# Patient Record
Sex: MALE | Race: ASIAN | NOT HISPANIC OR LATINO | Employment: FULL TIME
[De-identification: names, ages, dates, MRNs, and addresses within clinical notes are randomized per-mention and may not be internally consistent; named-entity substitution may affect disease eponyms.]

---

## 2017-07-25 ENCOUNTER — RECORDS - HEALTHEAST (OUTPATIENT)
Dept: ADMINISTRATIVE | Facility: OTHER | Age: 22
End: 2017-07-25

## 2018-11-08 ENCOUNTER — RECORDS - HEALTHEAST (OUTPATIENT)
Dept: ADMINISTRATIVE | Facility: OTHER | Age: 23
End: 2018-11-08

## 2019-10-07 ENCOUNTER — OFFICE VISIT - HEALTHEAST (OUTPATIENT)
Dept: FAMILY MEDICINE | Facility: CLINIC | Age: 24
End: 2019-10-07

## 2019-10-07 ENCOUNTER — COMMUNICATION - HEALTHEAST (OUTPATIENT)
Dept: TELEHEALTH | Facility: CLINIC | Age: 24
End: 2019-10-07

## 2019-10-07 DIAGNOSIS — D75.A G6PD DEFICIENCY: ICD-10-CM

## 2019-10-07 DIAGNOSIS — L30.9 DERMATITIS: ICD-10-CM

## 2019-10-07 DIAGNOSIS — K92.1 BLOOD IN STOOL: ICD-10-CM

## 2019-10-07 DIAGNOSIS — Z00.00 WELL ADULT EXAM: ICD-10-CM

## 2019-10-07 LAB
ERYTHROCYTE [DISTWIDTH] IN BLOOD BY AUTOMATED COUNT: 10.1 % (ref 11–14.5)
FERRITIN SERPL-MCNC: 100 NG/ML (ref 27–300)
HCT VFR BLD AUTO: 45.3 % (ref 40–54)
HGB BLD-MCNC: 15.2 G/DL (ref 14–18)
HIV 1+2 AB+HIV1 P24 AG SERPL QL IA: NEGATIVE
MCH RBC QN AUTO: 29.7 PG (ref 27–34)
MCHC RBC AUTO-ENTMCNC: 33.5 G/DL (ref 32–36)
MCV RBC AUTO: 89 FL (ref 80–100)
PLATELET # BLD AUTO: 337 THOU/UL (ref 140–440)
PMV BLD AUTO: 9.1 FL (ref 7–10)
RBC # BLD AUTO: 5.1 MILL/UL (ref 4.4–6.2)
WBC: 7.8 THOU/UL (ref 4–11)

## 2019-10-07 RX ORDER — HYDROCORTISONE 2.5 %
CREAM (GRAM) TOPICAL
Qty: 30 G | Refills: 0 | Status: SHIPPED | OUTPATIENT
Start: 2019-10-07 | End: 2021-07-21

## 2019-10-07 ASSESSMENT — MIFFLIN-ST. JEOR: SCORE: 1800.25

## 2020-09-12 ENCOUNTER — RECORDS - HEALTHEAST (OUTPATIENT)
Dept: LAB | Facility: CLINIC | Age: 25
End: 2020-09-12

## 2020-09-12 LAB — SICKLE CELL PREP: NEGATIVE

## 2020-09-13 LAB — GLUCOSE-6-PHOSPHATE DEHYDROGENASE QUAL - HISTORICAL: ABNORMAL

## 2020-11-07 ENCOUNTER — RECORDS - HEALTHEAST (OUTPATIENT)
Dept: LAB | Facility: CLINIC | Age: 25
End: 2020-11-07

## 2020-11-08 LAB — GLUCOSE-6-PHOSPHATE DEHYDROGENASE QUAL - HISTORICAL: ABNORMAL

## 2020-11-11 ENCOUNTER — OFFICE VISIT - HEALTHEAST (OUTPATIENT)
Dept: FAMILY MEDICINE | Facility: CLINIC | Age: 25
End: 2020-11-11

## 2020-11-11 ENCOUNTER — RECORDS - HEALTHEAST (OUTPATIENT)
Dept: GENERAL RADIOLOGY | Facility: CLINIC | Age: 25
End: 2020-11-11

## 2020-11-11 DIAGNOSIS — R07.89 OTHER CHEST PAIN: ICD-10-CM

## 2020-11-11 DIAGNOSIS — Z84.89 FAMILY HISTORY OF SUDDEN DEATH IN FATHER: ICD-10-CM

## 2020-11-11 DIAGNOSIS — Z22.7 LATENT TUBERCULOSIS: ICD-10-CM

## 2020-11-11 DIAGNOSIS — R07.89 ATYPICAL CHEST PAIN: ICD-10-CM

## 2020-11-11 DIAGNOSIS — Z22.7 LTBI (LATENT TUBERCULOSIS INFECTION): ICD-10-CM

## 2020-11-11 DIAGNOSIS — Z00.00 HEALTHCARE MAINTENANCE: ICD-10-CM

## 2020-11-11 LAB
ANION GAP SERPL CALCULATED.3IONS-SCNC: 5 MMOL/L (ref 5–18)
ATRIAL RATE - MUSE: 62 BPM
BUN SERPL-MCNC: 13 MG/DL (ref 8–22)
CALCIUM SERPL-MCNC: 9.7 MG/DL (ref 8.5–10.5)
CHLORIDE BLD-SCNC: 107 MMOL/L (ref 98–107)
CHOLEST SERPL-MCNC: 197 MG/DL
CO2 SERPL-SCNC: 27 MMOL/L (ref 22–31)
CREAT SERPL-MCNC: 1.41 MG/DL (ref 0.7–1.3)
DIASTOLIC BLOOD PRESSURE - MUSE: NORMAL
ERYTHROCYTE [DISTWIDTH] IN BLOOD BY AUTOMATED COUNT: 9.7 % (ref 11–14.5)
ERYTHROCYTE [SEDIMENTATION RATE] IN BLOOD BY WESTERGREN METHOD: 1 MM/HR (ref 0–15)
FASTING STATUS PATIENT QL REPORTED: NORMAL
GFR SERPL CREATININE-BSD FRML MDRD: >60 ML/MIN/1.73M2
GLUCOSE BLD-MCNC: 85 MG/DL (ref 70–125)
HCT VFR BLD AUTO: 44.7 % (ref 40–54)
HDLC SERPL-MCNC: 43 MG/DL
HGB BLD-MCNC: 14.6 G/DL (ref 14–18)
INTERPRETATION ECG - MUSE: NORMAL
MCH RBC QN AUTO: 29.9 PG (ref 27–34)
MCHC RBC AUTO-ENTMCNC: 32.6 G/DL (ref 32–36)
MCV RBC AUTO: 92 FL (ref 80–100)
P AXIS - MUSE: 43 DEGREES
PLATELET # BLD AUTO: 335 THOU/UL (ref 140–440)
PMV BLD AUTO: 9 FL (ref 7–10)
POTASSIUM BLD-SCNC: 4.4 MMOL/L (ref 3.5–5)
PR INTERVAL - MUSE: 152 MS
QRS DURATION - MUSE: 88 MS
QT - MUSE: 358 MS
QTC - MUSE: 363 MS
R AXIS - MUSE: 68 DEGREES
RBC # BLD AUTO: 4.88 MILL/UL (ref 4.4–6.2)
SODIUM SERPL-SCNC: 139 MMOL/L (ref 136–145)
SYSTOLIC BLOOD PRESSURE - MUSE: NORMAL
T AXIS - MUSE: 8 DEGREES
VENTRICULAR RATE- MUSE: 62 BPM
WBC: 8.6 THOU/UL (ref 4–11)

## 2020-11-11 ASSESSMENT — MIFFLIN-ST. JEOR: SCORE: 1801.98

## 2020-11-11 NOTE — ASSESSMENT & PLAN NOTE
Patient has had a flu shot, up-to-date vaccines, HIV testing done,   Report provided to transport service.  Report called to [facility] and given to Christina ZHENG.  Pt transported with stable vitals.

## 2020-11-13 ENCOUNTER — COMMUNICATION - HEALTHEAST (OUTPATIENT)
Dept: SCHEDULING | Facility: CLINIC | Age: 25
End: 2020-11-13

## 2020-11-24 ENCOUNTER — OFFICE VISIT - HEALTHEAST (OUTPATIENT)
Dept: FAMILY MEDICINE | Facility: CLINIC | Age: 25
End: 2020-11-24

## 2020-11-24 DIAGNOSIS — F32.1 MODERATE MAJOR DEPRESSION (H): ICD-10-CM

## 2020-11-24 RX ORDER — VENLAFAXINE HYDROCHLORIDE 75 MG/1
75 CAPSULE, EXTENDED RELEASE ORAL DAILY
Qty: 60 CAPSULE | Refills: 0 | Status: SHIPPED | OUTPATIENT
Start: 2020-11-24 | End: 2021-07-21

## 2020-11-24 ASSESSMENT — PATIENT HEALTH QUESTIONNAIRE - PHQ9: SUM OF ALL RESPONSES TO PHQ QUESTIONS 1-9: 16

## 2020-12-09 ENCOUNTER — OFFICE VISIT - HEALTHEAST (OUTPATIENT)
Dept: FAMILY MEDICINE | Facility: CLINIC | Age: 25
End: 2020-12-09

## 2020-12-09 DIAGNOSIS — F32.1 MODERATE MAJOR DEPRESSION (H): ICD-10-CM

## 2020-12-09 ASSESSMENT — PATIENT HEALTH QUESTIONNAIRE - PHQ9: SUM OF ALL RESPONSES TO PHQ QUESTIONS 1-9: 8

## 2021-01-04 ENCOUNTER — OFFICE VISIT - HEALTHEAST (OUTPATIENT)
Dept: BEHAVIORAL HEALTH | Facility: CLINIC | Age: 26
End: 2021-01-04

## 2021-01-04 ENCOUNTER — AMBULATORY - HEALTHEAST (OUTPATIENT)
Dept: BEHAVIORAL HEALTH | Facility: CLINIC | Age: 26
End: 2021-01-04

## 2021-01-04 ENCOUNTER — COMMUNICATION - HEALTHEAST (OUTPATIENT)
Dept: ADMINISTRATIVE | Facility: CLINIC | Age: 26
End: 2021-01-04

## 2021-01-04 DIAGNOSIS — F43.23 ADJUSTMENT DISORDER WITH MIXED ANXIETY AND DEPRESSED MOOD: ICD-10-CM

## 2021-01-04 DIAGNOSIS — K64.9 HEMORRHOIDS, UNSPECIFIED HEMORRHOID TYPE: ICD-10-CM

## 2021-01-04 ASSESSMENT — PATIENT HEALTH QUESTIONNAIRE - PHQ9: SUM OF ALL RESPONSES TO PHQ QUESTIONS 1-9: 8

## 2021-01-04 ASSESSMENT — ANXIETY QUESTIONNAIRES
1. FEELING NERVOUS, ANXIOUS, OR ON EDGE: MORE THAN HALF THE DAYS
7. FEELING AFRAID AS IF SOMETHING AWFUL MIGHT HAPPEN: SEVERAL DAYS
IF YOU CHECKED OFF ANY PROBLEMS ON THIS QUESTIONNAIRE, HOW DIFFICULT HAVE THESE PROBLEMS MADE IT FOR YOU TO DO YOUR WORK, TAKE CARE OF THINGS AT HOME, OR GET ALONG WITH OTHER PEOPLE: SOMEWHAT DIFFICULT
5. BEING SO RESTLESS THAT IT IS HARD TO SIT STILL: SEVERAL DAYS
2. NOT BEING ABLE TO STOP OR CONTROL WORRYING: SEVERAL DAYS
GAD7 TOTAL SCORE: 7
6. BECOMING EASILY ANNOYED OR IRRITABLE: SEVERAL DAYS
3. WORRYING TOO MUCH ABOUT DIFFERENT THINGS: SEVERAL DAYS
4. TROUBLE RELAXING: NOT AT ALL

## 2021-01-19 ENCOUNTER — AMBULATORY - HEALTHEAST (OUTPATIENT)
Dept: BEHAVIORAL HEALTH | Facility: CLINIC | Age: 26
End: 2021-01-19

## 2021-01-21 ENCOUNTER — COMMUNICATION - HEALTHEAST (OUTPATIENT)
Dept: FAMILY MEDICINE | Facility: CLINIC | Age: 26
End: 2021-01-21

## 2021-01-22 ENCOUNTER — AMBULATORY - HEALTHEAST (OUTPATIENT)
Dept: FAMILY MEDICINE | Facility: CLINIC | Age: 26
End: 2021-01-22

## 2021-01-22 DIAGNOSIS — Z20.822 EXPOSURE TO COVID-19 VIRUS: ICD-10-CM

## 2021-01-27 ENCOUNTER — RECORDS - HEALTHEAST (OUTPATIENT)
Dept: ADMINISTRATIVE | Facility: OTHER | Age: 26
End: 2021-01-27

## 2021-05-27 ASSESSMENT — PATIENT HEALTH QUESTIONNAIRE - PHQ9
SUM OF ALL RESPONSES TO PHQ QUESTIONS 1-9: 16
SUM OF ALL RESPONSES TO PHQ QUESTIONS 1-9: 8
SUM OF ALL RESPONSES TO PHQ QUESTIONS 1-9: 8

## 2021-05-28 ASSESSMENT — ANXIETY QUESTIONNAIRES: GAD7 TOTAL SCORE: 7

## 2021-06-02 NOTE — PROGRESS NOTES
Adult Physical:    CC:  Establish care    HPI:  Some blood in stool while in service.  Said it was checked.  Doesn't know what the cause was ? Hemorrhoids.    Patient Active Problem List   Diagnosis     G6PD deficiency     Past Medical History:  History reviewed. No pertinent past medical history.    Past Surgical History:  History reviewed. No pertinent surgical history.  (wisdom teeth out)    Medicines:  none    Allergies:  No Known Allergies    Family History:  No family history on file.    Social History:  Social History     Socioeconomic History     Marital status: Single     Spouse name: Not on file     Number of children: Not on file     Years of education: Not on file     Highest education level: Not on file   Occupational History     Not on file   Social Needs     Financial resource strain: Not on file     Food insecurity:     Worry: Not on file     Inability: Not on file     Transportation needs:     Medical: Not on file     Non-medical: Not on file   Tobacco Use     Smoking status: Former Smoker     Packs/day: 0.00     Smokeless tobacco: Never Used     Tobacco comment: quit 2018   Substance and Sexual Activity     Alcohol use: Not on file     Drug use: Not on file     Sexual activity: Not on file   Lifestyle     Physical activity:     Days per week: Not on file     Minutes per session: Not on file     Stress: Not on file   Relationships     Social connections:     Talks on phone: Not on file     Gets together: Not on file     Attends Hoahaoism service: Not on file     Active member of club or organization: Not on file     Attends meetings of clubs or organizations: Not on file     Relationship status: Not on file     Intimate partner violence:     Fear of current or ex partner: Not on file     Emotionally abused: Not on file     Physically abused: Not on file     Forced sexual activity: Not on file   Other Topics Concern     Not on file   Social History Narrative     Not on file     Review of Systems:  10  "point review.  unremarkable    Physical Exam:  /68 (Patient Site: Right Arm, Patient Position: Sitting, Cuff Size: Adult Regular)   Pulse 80   Temp 98.5  F (36.9  C) (Oral)   Ht 5' 7.5\" (1.715 m)   Wt 186 lb (84.4 kg)   BMI 28.70 kg/m    Gen:   Alert, not distressed  Head:   Normocephalic, without obvious abnormality, atraumatic  Eyes:   PERRL, conjunctiva/corneas clear, EOM's intact  Ears:   Normal tympanic membranes and external ear canals  Nose:    Mucosa normal, no drainage or sinus tenderness  Throat:    No erythema or exudates  Neck:    No adenopathy no nodules in thyroid, normal ROM  Lungs:    Clear to auscultation bilaterally, respirations unlabored  Chest wall:  No tenderness or deformity  Heart:     Regular, normal S1 and S2, no murmur, gallop or rub  Abdomen:  Soft, non-tender, no masses, no organomegaly  Back:    Symmetric, no curvature, ROM normal, no CVA tenderness  Extremities: Extremities normal, atraumatic, no cyanosis or edema  Skin:   Skin color, texture, turgor normal, no rashes or lesions  Lymph nodes: Cervical, and supraclavicular, nodes normal  Neurologic: CNII-XII intact.   DTRs normal and symmetric.  Symmetric strength and sensation.    Immunizations Due:  Flu    Tobacco Cessation:   n/a    Cancer Screening:  None due    Weight management:   The patient was counseled regarding nutrition and physical activity    Health Care Directives:  Full code.    Assessment and Plan:  1. Well adult exam  - HIV Antigen/Antibody Screening [place]  - HM2(CBC w/o Differential)  - Influenza, Seasonal Quad, PF =/> 6months    2. Dermatitis  - hydrocortisone 2.5 % cream; Apply thin layer to affected area two times per day.  Dispense: 30 g; Refill: 0    3. Blood in stool  - Ambulatory referral to Gastroenterology    4. G6PD deficiency  - Ferritin   "

## 2021-06-03 VITALS
DIASTOLIC BLOOD PRESSURE: 68 MMHG | WEIGHT: 186 LBS | SYSTOLIC BLOOD PRESSURE: 102 MMHG | HEIGHT: 68 IN | HEART RATE: 80 BPM | BODY MASS INDEX: 28.19 KG/M2 | TEMPERATURE: 98.5 F

## 2021-06-04 VITALS
BODY MASS INDEX: 29.35 KG/M2 | HEART RATE: 69 BPM | WEIGHT: 187 LBS | DIASTOLIC BLOOD PRESSURE: 78 MMHG | SYSTOLIC BLOOD PRESSURE: 116 MMHG | HEIGHT: 67 IN | RESPIRATION RATE: 17 BRPM | TEMPERATURE: 69 F

## 2021-06-08 ENCOUNTER — OFFICE VISIT - HEALTHEAST (OUTPATIENT)
Dept: FAMILY MEDICINE | Facility: CLINIC | Age: 26
End: 2021-06-08

## 2021-06-08 DIAGNOSIS — Z22.7 LTBI (LATENT TUBERCULOSIS INFECTION): ICD-10-CM

## 2021-06-13 NOTE — PROGRESS NOTES
"Love Ricks is a 25 y.o. male who is being evaluated via a billable telephone visit.      The patient has been notified of following:     \"This telephone visit will be conducted via a call between you and your physician/provider. We have found that certain health care needs can be provided without the need for a physical exam.  This service lets us provide the care you need with a short phone conversation.  If a prescription is necessary we can send it directly to your pharmacy.  If lab work is needed we can place an order for that and you can then stop by our lab to have the test done at a later time.    Telephone visits are billed at different rates depending on your insurance coverage. During this emergency period, for some insurers they may be billed the same as an in-person visit.  Please reach out to your insurance provider with any questions.    If during the course of the call the physician/provider feels a telephone visit is not appropriate, you will not be charged for this service.\"    Patient has given verbal consent to a Telephone visit? Yes    What phone number would you like to be contacted at? [phone]    Patient would like to receive their AVS by AVS Preference: Mail a copy.    Additional provider notes:   Feels like he is depressed  Family members recommended he seek out evaluation and help.  PHQ 9 reviewed.    Appetite feels off.  Either will eat too much or not eat anything at all and skip meals.  Patient is in school.  Feels often mentally exhausted unable to complete assignments.  He is fortunately maintaining his grades enough to continue.    Has had some relationship stress.  Ended a relationship of about 5 years.  Mostly his decision to separate but also somewhat mutual.    Has had fleeting thoughts about harming himself.  Has not made specific plans.  Thoughts seem to be fleeting but somewhat concerning regarding impulsiveness.  He is not ever felt that he would act on them.  Does have " access to firearms.  Trigger lock is present.  We discussed removing the key from his possession or removing the gun from his possession for safety.  He will consider.    Has not used alcohol for a number of months.  No other drug use including THC.    He has tried to exercise to help with his mood issues.    PHQ-9:  Little interest or pleasure in doing things: More than half the days  Feeling down, depressed, or hopeless: Several days  Trouble falling or staying asleep, or sleeping too much: More than half the days  Feeling tired or having little energy: Nearly every day  Poor appetite or overeating: Nearly every day  Feeling bad about yourself - or that you are a failure or have let yourself or your family down: Several days  Trouble concentrating on things, such as reading the newspaper or watching television: More than half the days  Moving or speaking so slowly that other people could have noticed. Or the opposite - being so fidgety or restless that you have been moving around a lot more than usual: Several days  Thoughts that you would be better off dead, or of hurting yourself in some way: Several days  PHQ-9 Total Score: 16  If you checked off any problems, how difficult have these problems made it for you to do your work, take care of things at home, or get along with other people?: Somewhat difficult     Assessment/Plan:  1. Moderate major depression (H)  Diagnosis discussed.  I did recommend intervention.  Discussed options of behavioral therapy and medication.  He would like to pursue medication now and not behavioral therapy.  Open to a potentially in the future if needed.    Safety measures as noted above.  Emergency plan of emergency room visit if needed in crisis.    Discussed medication start.  Needs a more activating medicine so we agreed on venlafaxine.  Side effects reviewed.  Recommend 1 to 2-week follow-up to ensure that he is making adequate progress and not having regression or significant  side effects for which would want to change medication.  - venlafaxine (EFFEXOR-XR) 75 MG 24 hr capsule; Take 1 capsule (75 mg total) by mouth daily.  Dispense: 60 capsule; Refill: 0    Phone call duration:  16 minutes    Denis Mejias MD

## 2021-06-13 NOTE — PROGRESS NOTES
"Love Ricks is a 25 y.o. male who is being evaluated via a billable telephone visit.      The patient has been notified of following:     \"This telephone visit will be conducted via a call between you and your physician/provider. We have found that certain health care needs can be provided without the need for a physical exam.  This service lets us provide the care you need with a short phone conversation.  If a prescription is necessary we can send it directly to your pharmacy.  If lab work is needed we can place an order for that and you can then stop by our lab to have the test done at a later time.    Telephone visits are billed at different rates depending on your insurance coverage. During this emergency period, for some insurers they may be billed the same as an in-person visit.  Please reach out to your insurance provider with any questions.    If during the course of the call the physician/provider feels a telephone visit is not appropriate, you will not be charged for this service.\"    Patient has given verbal consent to a Telephone visit? Yes    What phone number would you like to be contacted at? [phone]    Patient would like to receive their AVS by AVS Preference: Mail a copy.    Additional provider notes:   25-year-old man had a phone visit for follow-up on Effexor start.  He actually took Effexor for about 4 days and then stopped.  It was giving him some abdominal discomfort and nausea and so he stopped the medicine.  Interestingly, he reports that he feels like he is getting better.  He feels the medicine did help him overcome some depressive symptoms.    PHQ-9:  Little interest or pleasure in doing things: Several days  Feeling down, depressed, or hopeless: Not at all  Trouble falling or staying asleep, or sleeping too much: More than half the days  Feeling tired or having little energy: Several days  Poor appetite or overeating: Several days  Feeling bad about yourself - or that you are a " failure or have let yourself or your family down: Not at all  Trouble concentrating on things, such as reading the newspaper or watching television: More than half the days  Moving or speaking so slowly that other people could have noticed. Or the opposite - being so fidgety or restless that you have been moving around a lot more than usual: Several days  Thoughts that you would be better off dead, or of hurting yourself in some way: Not at all  PHQ-9 Total Score: 8  If you checked off any problems, how difficult have these problems made it for you to do your work, take care of things at home, or get along with other people?: Not difficult at all     We discussed treatment going forward.  He indicated that he does not want to try any other difficult medicine although we discussed that he would likely tolerate something much better than he tolerated Wellbutrin.  We discussed either proceeding with that or getting involved with a counselor.  He thought counseling was a better option for him at this time.  He was provided with the phone number for the scheduling department for that and referral was entered.    Assessment/Plan:  1. Moderate major depression (H)  - AMB REFERRAL TO MENTAL HEALTH AND ADDICTION  - Adult (18+); Outpatient Treatment; Individual/Couples/Family/Group Therapy/Health Psychology; [facility]; Any Clinic Location; We will contact you to schedule the appointment or plea...        Phone call duration:  7 minutes    Denis Mejias MD

## 2021-06-13 NOTE — PROGRESS NOTES
Adult Male Physical  A/P    Problem List Items Addressed This Visit        Unprioritized    Healthcare maintenance - Primary     Patient has had a flu shot, up-to-date vaccines, HIV testing done,         Relevant Orders    Cholesterol, Total    HDL Cholesterol    LTBI (latent tuberculosis infection)     x-ray done and negative today.           Relevant Orders    XR Chest 2 Views (Completed)    Atypical chest pain     Negative EKG, chest x-ray today.  Troponin pending but labs otherwise look good.  Covid test done for single reason of atypical chest pain.  Reassured for now, gradually increase exercise, follow-up as needed.         Relevant Orders    Symptomatic COVID-19 Virus (CORONAVIRUS) PCR    HM2(CBC w/o Differential) (Completed)    Basic Metabolic Panel    Erythrocyte Sedimentation Rate (Completed)    Electrocardiogram Perform - Clinic (Completed)    XR Chest 2 Views (Completed)    Family history of sudden death in father     EKG negative for sign of Brugada syndrome, normal QT interval, reassurance         Relevant Orders    Electrocardiogram Perform - Clinic (Completed)              HPI  Current Concerns/ Questions  25-year-old, currently in the [employer], here for physical exam.  A few concerns.  First, recently had a Mantoux placed.  It is very positive.  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  Went through full 10 months of treatment.  Completed it.  He has no symptoms.    Next, for the last 2 to 3 weeks has been having funny chest pain symptoms.  Usually dull, usually at rest, last between 20 and 30 seconds in a few minutes, no shortness of breath, no dizziness, no other symptoms.    Patient previously exercised regularly, this is fallen off lately.  Now he weight lifts occasionally but has stopped doing cardio altogether.  Plans to get back into this.    No other significant past medical problems.  Father  of sudden death at an early age.  Tells of  this or not well known.  Apparently woke up to go the bathroom, felt dizzy and then never woke up again.    PFSH:  Current medications reviewed as follows:  Current Outpatient Medications on File Prior to Visit   Medication Sig     hydrocortisone 2.5 % cream Apply thin layer to affected area two times per day.     No current facility-administered medications on file prior to visit.      Patient Active Problem List   Diagnosis     G6PD deficiency     No past medical history on file.  No past surgical history on file.  No family history on file.  Social History     Tobacco Use   Smoking Status Former Smoker     Packs/day: 0.00   Smokeless Tobacco Never Used   Tobacco Comment    quit 2018     Other Habits:  Alcohol/ Drug use no  Social History     Social History Narrative     Not on file     Immunization History   Administered Date(s) Administered     Adenovirus, Types 4 & 7,live,oral 08/28/2015     HPV Quadrivalent 08/18/2011     Hep A / Hep B 08/28/2015, 12/09/2015, 06/10/2016, 06/16/2016     Hep B, Peds or Adolescent 03/16/2009     Hep B, historic 09/23/2008, 10/21/2009     INFLUENZA,SEASONAL QUAD, PF, =/> 6months 10/07/2019     IPV 03/16/2009, 08/10/2010, 08/18/2011, 08/28/2015     Influenza, inj, historic,unspecified 09/30/2009, 10/31/2010, 10/19/2012, 10/26/2016, 10/03/2017, 11/15/2018     Influenza, nasal, historic 12/09/2015     MMR 09/23/2008, 03/16/2009, 08/28/2015     Meningococcal MCV4 Conjugate,Unspecified 08/28/2015     Meningococcal MCV4P 08/10/2010     Td, Adult, Absorbed 10/21/2009     Tdap 09/23/2008, 03/16/2009, 08/28/2015     Typhoid, historic, Unspecified 10/03/2017     Varicella 03/16/2009, 08/07/2009, 08/28/2015     Body mass index is 29.01 kg/m .    ROS  Full 10 system review including constitutional, respiratory, cardiac, gi, urinary, rheumatologic, neurologic, reproductive, dermatologic psychiatric is  performed  and is otherwise negative       Objective  Physical Exam  Vitals:    11/11/20  "1421   BP: 116/78   Patient Site: Right Arm   Patient Position: Sitting   Cuff Size: Adult Regular   Pulse: 69   Resp: 17   Temp: (!) 69  F (20.6  C)   TempSrc: Tympanic   Weight: 187 lb (84.8 kg)   Height: 5' 7.32\" (1.71 m)     Pleasant, fit appearing  Gen- alert and oriented x3, no acute distress  HEENT- Normocephalic atraumatic   pupils equal and reactive, EOMI.    TMs visualized and normal, ear canals normal.    Mouth moist with normal mucosa no ulceration, dentition normal or in good repair.  Neck- supple, no adenopathy or thyromegaly  Chest- Normal chest wall apperance, normal inspiration and expiration.  Clear to asculation.  CV- Regular rate and rhythm, normal tones, no murmus, gallops or rubs.  Abd-  Soft, nodistended, nontender.  Normal bowel sounds, no mass or organ enlargement.  Genitalia- normal penis, scrotum, testicles.  No hernia  Ext- Atraumatic,  No synovial thickening. Good perfusion, no edema. Periph pulses detected  Skin- warm and dry, no rash  Neuro- Cranial nerves grossly intact.  Normal gait, normal strength.  Reflexes symmetric.  Coordination intact.    Diagnostics  Results for orders placed or performed in visit on 11/11/20   HM2(CBC w/o Differential)   Result Value Ref Range    WBC 8.6 4.0 - 11.0 thou/uL    RBC 4.88 4.40 - 6.20 mill/uL    Hemoglobin 14.6 14.0 - 18.0 g/dL    Hematocrit 44.7 40.0 - 54.0 %    MCV 92 80 - 100 fL    MCH 29.9 27.0 - 34.0 pg    MCHC 32.6 32.0 - 36.0 g/dL    RDW 9.7 (L) 11.0 - 14.5 %    Platelets 335 140 - 440 thou/uL    MPV 9.0 7.0 - 10.0 fL   Electrocardiogram Perform - Clinic   Result Value Ref Range    SYSTOLIC BLOOD PRESSURE      DIASTOLIC BLOOD PRESSURE      VENTRICULAR RATE 62 BPM    ATRIAL RATE 62 BPM    P-R INTERVAL 152 ms    QRS DURATION 88 ms    Q-T INTERVAL 358 ms    QTC CALCULATION (BEZET) 363 ms    P Axis 43 degrees    R AXIS 68 degrees    T AXIS 8 degrees    MUSE DIAGNOSIS       Normal sinus rhythm  Normal ECG  No previous ECGs available     EKG " personally reviewed and agree

## 2021-06-14 NOTE — TELEPHONE ENCOUNTER
.Reason for Call: Request for an order or referral:covid test    Order or referral being requested: covid test    Date needed: as soon as possible for patients employer    Has the patient been seen by the PCP for this problem? YES    Additional comments: n/a    Phone number Patient can be reached at:  Home number on file [phone] (home)    Best Time:  asap    Can we leave a detailed message on this number?  Yes    Call taken on 1/21/2021 at 2:53 PM by Cindy Woodruff

## 2021-06-14 NOTE — PROGRESS NOTES
"Mental Health Psychotherapy Telephone Note    Patient: Love Ricks    : 1995 MRN: [id number]    Completed a 2 point identification verification: patient verbalized full name and date of birth.     Date: 20  Start time: 1800  Stop Time: 185   Session # 1    The patient has been notified of the following:   \"We have found that certain health care needs can be provided without the need for a face to face visit.  This service lets us provide the care you need with a phone conversation.  I will have full access to your [facility] medical record during this entire phone call.   I will be taking notes for your medical record. Since this is like an office visit, we will bill your insurance company for this service.  There are potential benefits and risks of telephone visits (e.g. limits to patient confidentiality) that differ from in-person visits.?  Confidentiality still applies for telephone services, and nobody will record the visit.  It is important to be in a quiet, private space that is free of distractions (including cell phone or other devices) during the visit.?? If during the course of the call I believe a telephone visit is not appropriate, you will not be charged for this service\"  Consent has been obtained for this service by care team member: Yes, per verbal agreement   Session Type: Patient is participating in a telemedicine phone visit     Those present for this session: Patient and Therapist    Chief complaint Patient is a 25-year-old male who was referred by his PCP to address concerns regarding psychosocial stressors and mood instability.  Patient indicated that over the course of the last couple of months he has been feeling very stressed due to being in boot [employer], taking classes and having relationship issues.  Patient stated that for a brief period of time he was on antidepressant although reported to his doctor side effects and is no longer on any psychotropic medication.  Patient indicated " "that he has had \"intermittent floating thoughts of dying\" although stated, that he has has never attempted nor considered following through with any passing thoughts of suicide.  Patient is hoping to gain skills and techniques to manage stress and improve mood.     New symptoms or complaints: Difficulty controlling worry, worrying too much, slight irritability, fee restless, feelling something might happen, periods of sadness, relationship issues, passing suicidal thoughts and concern regarding future after graduation.    Functional Impairment:   Personal: 3  Family: 1  Work: 0  Social:3  PHQ-9 Scoring 8 indicating mild depression  DAPHNE-7 Scoring 7 indicating mild anxiety  C-SSRS Patient indicated that he has had brief fleeting suicidal thoughts without a plan for follow-through.        ASSESSMENT: Current Emotional / Mental Status (status of significant symptoms):              Risk status (Self / Other harm or suicidal ideation)              Patient denies any personal safety issues              Patient reports the following current or recent suicidal ideation or behaviors: indicated that he experienced some \"passive thoughts of dying but wouldnt do it.\".              Patient denies current or recent homicidal ideation or behaviors.              Patient denies current or recent self injurious behavior or ideation.              Patient denies other safety concerns.              Patient denies any risk factors              Patient indicated girlfriend is supportive               A safety and risk management plan has been developed including: Patient consented to co-develop a safety plan.  Safety and risk management plan was completed. Patient agreed to use the safety plan should any safety concerns arise.  A copy of the completed safety plan was given to the patient.                Attitude:                                   Cooperative               Orientation:                             Oriented x3              " "Speech                                    Clear                          Rate / Production:       Normal/ Responsive Normal                           Volume:                       Normal               Mood:                                      Normal              Thought Content:                    Clear               Thought Form:                        Coherent  Logical               Insight:                                     Good       Patient's impression of their current status: Patient stated, \"I have been pretty down because of a number of things going on in my life but I'm feeling better right now.\"    Therapist impression of patients current state: The patient presented for an initial intake session with the provider.  Patient is a 25-year-old male.  Patient was referred by his PCP for individual psychotherapy to address symptoms of depression, anxiety and psychosocial stressors.  The patient appeared cooperative and open-minded throughout the intake and was easily engaged in dialogue.  Therapist and patient verbally reviewed consent and privacy policy.  Patient reported understanding and provided verbal consent.  The patient has not engaged in outpatient psychotherapy services in the community so no diagnostic assessment on file are available.  Patient completed the following questionnaires PHQ-9, DAPHNE-7 and C-SSRS.  Patient indicated that he has had brief fleeting thoughts of dying but no specific intentions or plans.  Writer will further review patient's history during the next follow-up up encounter in order to complete a standardized diagnostic assessment.  Goals for treatment will be established after second or third follow-up session with this provider.  Patient is scheduled for a follow-up visit in 2 weeks.    Type of psychotherapeutic technique provided: Insight oriented and Solution-focused    Progress toward short term goals: Not yet established    Review of long term goals: Not yet " established     Diagnosis:  Adjustment reaction with anxiety and depressed mood low    Plan and Follow up:   1.  Patient will utilize safety plan if needed.  2.  Patient will contact his PCP if he is wanting to reconsider medication  3.  Continue obtaining adequate amount of sleep on a nightly basis.  4.  Patient will complete adult intake questionnaire  5.  Patient will return for follow-up appointment in 2 weeks.    Discharge Criteria/Planning: Patient will continue with follow-up until therapy can be discontinued without return of signs and symptoms.    I have reviewed the note as documented above.  This accurately captures the substance of my conversation with the patient.  As the provider I attest to compliance with applicable laws and regulations related to telemedicine.  Margarita Lloyd [facility]  1/4/20

## 2021-06-14 NOTE — TELEPHONE ENCOUNTER
Reason for Call: Request for an order or referral: hemorroids    Order or referral being requested: mngi     Date needed: as soon as possible    Has the patient been seen by the PCP for this problem? YES    Additional comments: n/a    Phone number Patient can be reached at:  Home number on file [phone] (home)    Best Time:  asap    Can we leave a detailed message on this number?  No    Call taken on 1/4/2021 at 3:31 PM by Cindy Woodruff

## 2021-06-14 NOTE — PROGRESS NOTES
Patient was scheduled for a follow-up appointment on 1/20/2021.  Writer received message through epic that he would like that appointment canceled.    Margarita GARCIA  1/19/21

## 2021-06-14 NOTE — TELEPHONE ENCOUNTER
patient is going to  training on 02/07 in [place] they request a covid test within five days of departure  This is the reason he gave me for the need of the Covid test

## 2021-06-14 NOTE — PROGRESS NOTES
"Step 1: Warning signs / cues (Thoughts, images, mood, situation, behavior) that a crisis may be developing: please check all that apply and/or add your own    Thoughts:   [] \"I don't matter\"   [] \"People would be better off without me\"  [] \"I'm a burden\"  [] \"I can't do this anymore\"  [] \"I just want this to end\"   [] \"Nothing makes it better\"  [x]Fleeting thoughts    Images:   [] Obsessive thoughts of death or dying:   [] Flashbacks   [] Visions of harm  [x]Thoughts of dying      Thinking Processes:   [] Ruminations (can't stop thinking about my problems):   [] Racing thoughts   [x] Intrusive thoughts (bothersome, unwanted thoughts that come out of nowhere):   [] Highly critical and negative thoughts:   [] Disorganized thinking:   [] Paranoia:   [] Depersonalization    Mood:  [] Worsening depression  [x] Hopelessness  [x] Helplessness  [] Intense anger  [] Intense worry  [] Agitation  [] Disinhibited (not caring about things or consequences)   [] Mood swings  [] Other   Behaviors:   [x] Isolating/withdrawing   [] Using drugs,   [] Using alcohol  [] Can't stop crying  [x] Impulsive  [] Reckless behaviors (acting without thinking)  [] Giving things away  [] Saying good-bye  [] Aggression  [] Not taking care of myself   [] Not taking care of my responsibilities  [] Sleeping too much  [] Not sleeping enough  [] Increasing frequency and duration of dissociation    Situations:   [] Bullying  [] Loss  [] Public shame  [] Legal issues  [] Anniversary of  [] Changes in symptoms   [] Physical Pain   [] Relationship problems  [] Trauma   [] Relapse of   [x] Financial stress   [x] Medical condition / \" stomach upset.\"  [] Other    Step 2: Coping strategies  Things I can do to take my mind off of my problems without contacting another person    Distress Tolerance Strategies   [x] Relaxation activities  [] Arts and crafts:   [] Play with my pet   [x] Listen to positive and upbeat music   [] Sensory based activities/self-soothe " with five senses  [] Watch a funny movie  [] [place]  [] Read a book  [] Change body temperature (ice pack/cold water)  [] Paced breathing/progressive muscle relaxation  [x] Intense exercise for 2-3 minutes; repeat    Physical Activities:     [x] Go for a walk  [x] Exercise  [] Yoga  [] Gardening  [] Meditation  [] Deep breathing   [] Stretching  []  Other     Focus on helpful thoughts:   List thoughts you can remind yourself of that will help you to be safe.      Step 3: People that provide distraction:    Name:   Name Linnea Home Work DESIRAE Lee   [phone]     Social settings that provide distraction  [] Movie theater  [] Pet store/humanT1 Visions society  [] Zoo  [] Coffee shop  [] Park  [] Library  [] Volunteering  [] Community center  [] Gym  [] Yazidi  [] Support group (i.e. twelve-step)  [] School and/or work  [x]Will converse with girlfriend              Step 4: Remind myself of people that are important to me and worth living                  for.     Remind myself of things/pets/beliefs that are important to me and              worth living for    Step 5: When I am in crisis, I can ask these people to help me use my safety plan:    Name:   Name Linnea Home Work DESIRAE Lee   [phone]           Step 6: Making the environment safe:    [] Remove alcohol  [] Remove drugs  [] Secure medications  [x] Dispose of old medications  [] Remove access to firearms  [] Remove things I could use to hurt myself  [] Take alternate routes from my usual routine  []  Arrange transportation rather than drive myself  [x] Spend time with / be around others      Step 7: Professionals or agencies I can contact during a crisis:    [x] [facility] Daytime Number: [phone]  [x] Suicide Prevention Lifeline: 5-100-221-UPMN (4673)  [x] Crisis Text Line Service (available 24 hours a day, 7 days a week):   [x] Text [place] to 185521   [x] Local Crisis Services   [x] Call 911 or go to my nearest  emergency department.    I helped develop this safety plan and agree to use it when needed. I have been given a copy of this plan.    Client signature verbal consent obtained from patient today    Margarita Lloyd [facility]  Today s date: 1/4/20      Adapted from Safety Plan Template 2008 Cristy Zamarripa and Devang Sales is reprinted with the express permission of the authors. No portion of the Safety Plan Template may be reproduced without the express, written permission. You can contact the authors at [email or web address] or [email or web address].

## 2021-06-16 PROBLEM — Z22.7 LTBI (LATENT TUBERCULOSIS INFECTION): Status: ACTIVE | Noted: 2020-11-11

## 2021-06-16 PROBLEM — Z84.89 FAMILY HISTORY OF SUDDEN DEATH IN FATHER: Status: ACTIVE | Noted: 2020-11-11

## 2021-06-16 PROBLEM — Z00.00 HEALTHCARE MAINTENANCE: Status: ACTIVE | Noted: 2020-11-11

## 2021-06-16 PROBLEM — R07.89 ATYPICAL CHEST PAIN: Status: ACTIVE | Noted: 2020-11-11

## 2021-06-16 PROBLEM — D75.A G6PD DEFICIENCY: Status: ACTIVE | Noted: 2019-10-07

## 2021-06-16 PROBLEM — K64.9 HEMORRHOIDS, UNSPECIFIED HEMORRHOID TYPE: Status: ACTIVE | Noted: 2021-01-04

## 2021-06-21 NOTE — LETTER
Letter by Russell Abraham MD at      Author: Russell Abraham MD Service: -- Author Type: --    Filed:  Encounter Date: 11/11/2020 Status: (Other)         11/11/2020  Love Ricks      To Whom It May Concern,    Love Ricks was seen today for physical exam.  He had a PPD test read at 20 mm-which is abnormal.  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  He has no symptoms.  Chest x-ray done at our clinic today is completely normal.    Obviously, he should not receive any more PPDs-as they will be as it it for the rest of his life.  He is very low risk of ever developing active tuberculosis due to previous treatment of his latent TB.      Feel free to contact me with questions.    Sincerely,          Russell Abraham MD

## 2021-06-26 ENCOUNTER — HEALTH MAINTENANCE LETTER (OUTPATIENT)
Age: 26
End: 2021-06-26

## 2021-06-26 NOTE — PROGRESS NOTES
Assessment/ Plan  1. LTBI (latent tuberculosis infection)  Patient received another [employer] from [employer] and it to his 20 mm  Letter again written explaining that additional man toes do not help after an initial positive  Offered patient chest x-ray for follow-up, he has no other symptoms, he declined eating this  Body mass index is 29.01 kg/m .    Subjective  CC:  Chief Complaint   Patient presents with     Follow-up     would like a note that says he doesnt need tb test anymore because it will always be positive     HPI:  26-year-old, history of LTBI as well as G6PD deficiency.  Treated and indicates that he completed full treatment many years ago.    Had known PPD and a mantoux placed last year.  20 mm.  Repeated again and he comes in for reading today after having tried to explain that he should not have this done.  Patient Active Problem List   Diagnosis     G6PD deficiency     Healthcare maintenance     LTBI (latent tuberculosis infection)     Atypical chest pain     Family history of sudden death in father     Hemorrhoids, unspecified hemorrhoid type     Current medications reviewed as follows:  Current Outpatient Medications on File Prior to Visit   Medication Sig     hydrocortisone 2.5 % cream Apply thin layer to affected area two times per day.     venlafaxine (EFFEXOR-XR) 75 MG 24 hr capsule Take 1 capsule (75 mg total) by mouth daily.     No current facility-administered medications on file prior to visit.      Social History     Tobacco Use   Smoking Status Former Smoker     Packs/day: 0.00   Smokeless Tobacco Never Used   Tobacco Comment    quit 2018     Social History     Social History Narrative    Currently in [employer],    Serious girlfriend living in [place]     Patient Care Team:  Russell Abraham MD as PCP - General (Family Medicine)  Denis Mejias MD as Assigned PCP  ROS  Denies cough, fevers, chills, night sweats, weight loss      Objective  Physical Exam  Vitals:     06/08/21 1656   BP: 102/70   Patient Site: Right Arm   Patient Position: Sitting   Cuff Size: Adult Regular   Pulse: 72   Weight: 187 lb (84.8 kg)     No acute distress, good color.  Diagnostics  20 mm PPD induration    Please note: Voice recognition software was used in this dictation.  It may therefore contain typographical errors.

## 2021-07-04 NOTE — LETTER
Letter by Russell Abraham MD at      Author: Rusesll Abraham MD Service: -- Author Type: --    Filed:  Encounter Date: 6/8/2021 Status: (Other)         6/8/2021  Love Ricks      To Whom It May Concern,    Love Ricks had another PPD  read today-it remains positive at 20 mm  This should not have been placed.    Patient history of latent TB and completed therapy a number of years ago.  As you know, once has a positive skin test future tests will s be positive.  Indeed, further challenges can make the reaction on the skin more intense -these sometimes can form ulcer.    Repeating PPD tests in Mr Man not helpful and could harm him.  Please stop this practice.      Feel free to contact me with questions.    Sincerely,          Russell Abraham MD

## 2021-07-06 VITALS
HEART RATE: 72 BPM | DIASTOLIC BLOOD PRESSURE: 70 MMHG | WEIGHT: 187 LBS | BODY MASS INDEX: 29.01 KG/M2 | SYSTOLIC BLOOD PRESSURE: 102 MMHG

## 2021-07-21 ENCOUNTER — OFFICE VISIT (OUTPATIENT)
Dept: FAMILY MEDICINE | Facility: CLINIC | Age: 26
End: 2021-07-21
Payer: OTHER GOVERNMENT

## 2021-07-21 VITALS
SYSTOLIC BLOOD PRESSURE: 103 MMHG | HEART RATE: 70 BPM | BODY MASS INDEX: 28.19 KG/M2 | DIASTOLIC BLOOD PRESSURE: 69 MMHG | WEIGHT: 186 LBS | HEIGHT: 68 IN

## 2021-07-21 DIAGNOSIS — F32.5 MAJOR DEPRESSIVE DISORDER IN REMISSION, UNSPECIFIED WHETHER RECURRENT (H): ICD-10-CM

## 2021-07-21 DIAGNOSIS — R21 RASH: Primary | ICD-10-CM

## 2021-07-21 PROCEDURE — 99213 OFFICE O/P EST LOW 20 MIN: CPT | Performed by: FAMILY MEDICINE

## 2021-07-21 RX ORDER — COVID-19 MOLECULAR TEST ASSAY
KIT MISCELLANEOUS
COMMUNITY
Start: 2021-04-28 | End: 2021-07-21

## 2021-07-21 RX ORDER — TRIAMCINOLONE ACETONIDE 5 MG/G
CREAM TOPICAL
Qty: 30 G | Refills: 0 | Status: SHIPPED | OUTPATIENT
Start: 2021-07-21 | End: 2022-03-18

## 2021-07-21 ASSESSMENT — PATIENT HEALTH QUESTIONNAIRE - PHQ9: SUM OF ALL RESPONSES TO PHQ QUESTIONS 1-9: 10

## 2021-07-21 ASSESSMENT — MIFFLIN-ST. JEOR: SCORE: 1803.7

## 2021-07-21 NOTE — PROGRESS NOTES
Assessment/ Plan  1. Rash  Number of issues  Appears to have some scarring as a result of portions of his complex tattoo on his right arm.  Itching associated with this.  Discussed possible referral to dermatology for strategizing if needed.  For now we will treat symptomatically with triamcinolone    Also has keratosis pilaris  Discussed AmLactin or Lac-Hydrin creams or lotions, possibly could use urea cream for potentially retinoid in the future        - triamcinolone (ARISTOCORT HP) 0.5 % external cream; Apply bid x 3 weeks  Dispense: 30 g; Refill: 0    2. Major depressive disorder in remission, unspecified whether recurrent (H)  Chronic,  PHQ-9 score:    PHQ 7/21/2021   PHQ-9 Total Score 10   Q9: Thoughts of better off dead/self-harm past 2 weeks Several days   Patient has fleeting thoughts of harming himself, has never tried.  These thoughts are very brief and he thinks it is unlikely he would ever act on it    However he does have a firearm in his house and he has thought that that is how he would do something  Has given the firearm to a relative for safekeeping.  I suggested he keep this off of his property.  Referral to therapist  Patient has crisis number      - MENTAL HEALTH REFERRAL  - Adult; Outpatient Treatment; Individual/Couples/Family/Group Therapy/Health Psychology; [facility]: [facility] [phone]; We will contact you to schedule the appointment or please call with any questions; Future  Body mass index is 28.52 kg/m .    Subjective  CC:  chief complaint  HPI:  26-year-old presents with a couple of questions.    First, he has a history of depression, would like to speak with a therapist.  Is done this before when he was in the .  Was on venlafaxine for a time, did not like how it made him feel and would like to stay away from medication.    Discussed suicidal ideation as discussed above      Also rash.  Some itching and some ridging around source of tattoo in his right  "arm.  Chronic bumps in the back of his arms which itch sometimes.  Seem to respond to moisturizing  Patient Active Problem List   Diagnosis     G6PD deficiency     Healthcare maintenance     LTBI (latent tuberculosis infection)     Atypical chest pain     Family history of sudden death in father     Hemorrhoids, unspecified hemorrhoid type     Current medications reviewed as follows:  [unfilled]  History   Smoking Status     Former Smoker     Packs/day: 0.00   Smokeless Tobacco     Never Used     Comment: quit 2018     Social History     Social History Narrative    Currently in [employer],  Serious girlfriend living in [place]     Patient Care Team:  Russell Abraham MD as PCP - General (Family Practice)  Russell Abraham MD as Assigned PCP  ROS  As above      Objective  Physical Exam  Vitals:    07/21/21 1620   BP: 103/69   BP Location: Right arm   Patient Position: Sitting   Cuff Size: Adult Large   Pulse: 70   Weight: 84.4 kg (186 lb)   Height: 1.72 m (5' 7.72\")     Rash consistent with keratosis pilaris  Some ridging of edges of tattoo that follow the tattoo, consistent with scarring  Good eye contact,  Appropriate thought and speech content      Diagnostics  None    Please note: Voice recognition software was used in this dictation.  It may therefore contain typographical errors.    "

## 2021-10-16 ENCOUNTER — HEALTH MAINTENANCE LETTER (OUTPATIENT)
Age: 26
End: 2021-10-16

## 2021-11-01 ENCOUNTER — TELEPHONE (OUTPATIENT)
Dept: BEHAVIORAL HEALTH | Facility: TELEHEALTH | Age: 26
End: 2021-11-01

## 2021-11-01 NOTE — TELEPHONE ENCOUNTER
[facility]  Behavioral Health Clinician Triage Service    [facility] PHQ-[facility] Responses:  [facility] Follow-up to PHQ 1/4/2021 7/21/2021 10/29/2021   PHQ-9 9. Suicide Ideation past 2 weeks Not at all Several days More than half the days   Thoughts of suicide or self harm in past 2 weeks - - Yes   Thoughts of suicide or self harm in past 2 weeks - - Yes   PHQ-9 Self harm plan? - - No   PHQ-9 Self harm action? - - No   PHQ-9 Safety concerns? - - No   PHQ-9 Self harm plan? - - No   PHQ-9 Self harm action? - - No   PHQ-9 Safety concerns? - - No        1st Outreach Date: November 1, 2021 Time: 11:11am  Outcome: Completed phone conversation / triage service.  See assessment and disposition below.  JOSE Larsen, [facility] my name is Renetta Church.  I m calling from  [place] to follow-up on a questionnaire you completed on [employer].      If it s ok I d like review a few of your symptoms/responses and a talk briefly  about how you have been doing to see if I might be able to provide some support and guidance.       Address/Location of patient: Home  Have any supportive people near them? Yes    Risk Assessment:  Patient has had a history of suicidal ideation: passive SI with no plan or intent  Patient reports the following current or recent suicidal ideation or behaviors: passive SI with no plan or intent.  Patient denies current or recent homicidal ideation or behaviors.  Patient denies current or recent self injurious behavior or ideation.  Patient denies other safety concerns.  Patient reports there are no firearms in the house  Protective Factors Positive social support   Risk Factors: none reported    ASQ Assessment:  1. In the past few weeks, have you wished you were dead?  No  2. In the past few weeks, have you felt that you or your family would be better off if you         were dead?  No  3. In the past week, have you been having thoughts about killing yourself?  No  4. Have you ever tried to  "kill yourself?  No    Disposition:    - Recommendations / Safety Plan: A safety and risk management plan has not been developed at this time, however patient was encouraged to call [facility] should there be a change in any of these risk factors.     Patient reported that he has experienced passive SI with no plan or intent and this is not new for himself. He reported that he \"would never do anything\". The appointment he missed was a therapy appointment and he is scheduled for 11/12/2021. Patient was okay with [facility] sending crisis resources to patient via [employer]. Patient not interested in completing safety plan at this time.        "

## 2021-11-19 ENCOUNTER — VIRTUAL VISIT (OUTPATIENT)
Dept: BEHAVIORAL HEALTH | Facility: CLINIC | Age: 26
End: 2021-11-19
Payer: OTHER GOVERNMENT

## 2021-11-19 DIAGNOSIS — F43.23 ADJUSTMENT DISORDER WITH MIXED ANXIETY AND DEPRESSED MOOD: Primary | ICD-10-CM

## 2021-11-19 PROCEDURE — 90834 PSYTX W PT 45 MINUTES: CPT | Mod: GT,95

## 2021-11-19 ASSESSMENT — COLUMBIA-SUICIDE SEVERITY RATING SCALE - C-SSRS
1. IN THE PAST MONTH, HAVE YOU WISHED YOU WERE DEAD OR WISHED YOU COULD GO TO SLEEP AND NOT WAKE UP?: YES
ATTEMPT PAST THREE MONTHS: NO
4. HAVE YOU HAD THESE THOUGHTS AND HAD SOME INTENTION OF ACTING ON THEM?: NO
2. HAVE YOU ACTUALLY HAD ANY THOUGHTS OF KILLING YOURSELF?: YES
TOTAL  NUMBER OF INTERRUPTED ATTEMPTS LIFETIME: NO
2. HAVE YOU ACTUALLY HAD ANY THOUGHTS OF KILLING YOURSELF LIFETIME?: YES
3. HAVE YOU BEEN THINKING ABOUT HOW YOU MIGHT KILL YOURSELF?: YES
1. IN THE PAST MONTH, HAVE YOU WISHED YOU WERE DEAD OR WISHED YOU COULD GO TO SLEEP AND NOT WAKE UP?: YES
5. HAVE YOU STARTED TO WORK OUT OR WORKED OUT THE DETAILS OF HOW TO KILL YOURSELF? DO YOU INTEND TO CARRY OUT THIS PLAN?: NO
4. HAVE YOU HAD THESE THOUGHTS AND HAD SOME INTENTION OF ACTING ON THEM?: NO
REASONS FOR IDEATION PAST MONTH: MOSTLY TO END OR STOP THE PAIN (YOU COULDN'T GO ON LIVING WITH THE PAIN OR HOW YOU WERE FEELING)
5. HAVE YOU STARTED TO WORK OUT OR WORKED OUT THE DETAILS OF HOW TO KILL YOURSELF? DO YOU INTEND TO CARRY OUT THIS PLAN?: NO

## 2021-11-19 ASSESSMENT — ANXIETY QUESTIONNAIRES
2. NOT BEING ABLE TO STOP OR CONTROL WORRYING: SEVERAL DAYS
GAD7 TOTAL SCORE: 5
5. BEING SO RESTLESS THAT IT IS HARD TO SIT STILL: NOT AT ALL
3. WORRYING TOO MUCH ABOUT DIFFERENT THINGS: SEVERAL DAYS
8. IF YOU CHECKED OFF ANY PROBLEMS, HOW DIFFICULT HAVE THESE MADE IT FOR YOU TO DO YOUR WORK, TAKE CARE OF THINGS AT HOME, OR GET ALONG WITH OTHER PEOPLE?: NOT DIFFICULT AT ALL
7. FEELING AFRAID AS IF SOMETHING AWFUL MIGHT HAPPEN: SEVERAL DAYS
7. FEELING AFRAID AS IF SOMETHING AWFUL MIGHT HAPPEN: SEVERAL DAYS
1. FEELING NERVOUS, ANXIOUS, OR ON EDGE: SEVERAL DAYS
4. TROUBLE RELAXING: NOT AT ALL
6. BECOMING EASILY ANNOYED OR IRRITABLE: SEVERAL DAYS

## 2021-11-19 ASSESSMENT — PATIENT HEALTH QUESTIONNAIRE - PHQ9
10. IF YOU CHECKED OFF ANY PROBLEMS, HOW DIFFICULT HAVE THESE PROBLEMS MADE IT FOR YOU TO DO YOUR WORK, TAKE CARE OF THINGS AT HOME, OR GET ALONG WITH OTHER PEOPLE: SOMEWHAT DIFFICULT
SUM OF ALL RESPONSES TO PHQ QUESTIONS 1-9: 12
SUM OF ALL RESPONSES TO PHQ QUESTIONS 1-9: 12

## 2021-11-19 NOTE — PROGRESS NOTES
[facility]   Mental Health & Addiction Services     Progress Note - Initial Visit    Patient  Name:  Love Ricks Date: 2021         Service Type: Individual     Visit Start Time: 12:30pm  Visit End Time: 1:20pm    Visit #: 1    Attendees: Client attended alone    Service Modality:  Video Visit:      Provider verified identity through the following two step process.  Patient provided:  Patient photo and Patient     Telemedicine Visit: The patient's condition can be safely assessed and treated via synchronous audio and visual telemedicine encounter.      Reason for Telemedicine Visit: Patient has requested telehealth visit    Originating Site (Patient Location): Patient's home    Distant Site (Provider Location): Provider Remote Setting- Home Office    Consent:  The patient/guardian has verbally consented to: the potential risks and benefits of telemedicine (video visit) versus in person care; bill my insurance or make self-payment for services provided; and responsibility for payment of non-covered services.     Patient would like the video invitation sent by:  My Chart    Mode of Communication:  Video Conference via Amwell    As the provider I attest to compliance with applicable laws and regulations related to telemedicine.       DATA:   Interactive Complexity: No   Crisis: No     Presenting Concerns/  Current Stressors:   Today patient and therapist started the diagnostic assessment but were unable to complete due to time constraints.  Therapist assessed for risk and safety.  Patient endorses suicidal ideation with methods but no plan or intent.  Patient reports intrusive thoughts of shooting himself.  These thoughts typically show up during the night time and last a few seconds.  Patient denied a history of suicide attempts, self-harm behaviors, and homicidal ideation.  Patient and therapist spent the session creating a safety plan.  Patient agreed to using safety plan.  Therapist  "also provided the patient with mental health crisis numbers for [facility].  In the coming weeks, patient will use his safety plan.  Should there be a change in intensity or severity of symptoms related to SI or SIB, patient will call 911, or go to local ED for evaluation.  Patient and therapist will continue with DA during next session.        ASSESSMENT:  Mental Status Assessment:  Appearance:   Appropriate   Eye Contact:   Good   Psychomotor Behavior: Normal   Attitude:   Cooperative  Friendly  Orientation:   All  Speech   Rate / Production: Normal/ Responsive   Volume:  Normal   Mood:    Normal  Affect:    Appropriate   Thought Content:  Clear   Thought Form:  Coherent   Insight:    Good       Safety Issues and Plan for Safety and Risk Management:     [place] Suicide Severity Rating Scale (Lifetime/Recent)  [place] Suicide Severity Rating (Lifetime/Recent) 1/4/2021 11/19/2021   1. Wish to be Dead (Lifetime) Yes Yes   Wish to be Dead Description (Lifetime) (No Data) -   Comments feeling overwelmed with multiple stressors -   1. Wish to be Dead (Recent) No Yes   2. Non-Specific Active Suicidal Thoughts (Lifetime) No Yes   2. Non-Specific Active Suicidal Thoughts (Recent) No Yes   3. Active Suicidal Ideation with any Methods (Not Plan) Without Intent to Act (Lifetime) No Yes   3. Active Suicidal Ideation with any Methods (Not Plan) Without Intent to Act (Recent) No Yes   Active Suicidal Ideation with any Methods (Not Plan) Description (Recent) - \"I have thought about shooting myself with a gun, but I never made a plan and would never actually do it.  I have so much to live for.\"   4. Active Suicidal Ideation with Some Intent to Act, Without Specific Plan (Lifetime) No No   4. Active Suicidal Ideation with Some Intent to Act, Without Specific Plan (Recent) No No   5. Active Suicidal Ideation with Specific Plan and Intent (Lifetime) No No   5. Active Suicidal Ideation with Specific Plan and Intent (Recent) No No "   Most Severe Ideation Rating (Lifetime) - 3   Frequency (Lifetime) - 2   Duration (Lifetime) - 1   Protective Factors  (Lifetime) - 1   Most Severe Ideation Rating (Past Month) - 3   Duration (Past Month) - 1   Controllability (Past Month) - 1   Reasons for Ideation (Past Month) - 4   Actual Attempt (Past 3 Months) - No   Has subject engaged in non-suicidal self-injurious behavior? (Lifetime) - No   Has subject engaged in non-suicidal self-injurious behavior? (Past 3 Months) - No   Interrupted Attempts (Lifetime) - No     Patient denies current fears or concerns for personal safety.  Patient reports the following current or recent suicidal ideation or behaviors: intrusive thoughts of active SI.  Patient denies current or recent homicidal ideation or behaviors.  Patient denies current or recent self injurious behavior or ideation.  Patient denies other safety concerns.  A safety and risk management plan has been developed including: Patient consented to co-developed safety plan.  Safety and risk management plan was completed.  Patient agreed to use safety plan should any safety concerns arise.  A copy was given to the patient.  Patient reports there are firearms in the house. The firearms are secured in a locked space.     Diagnostic Criteria:  Adjustment Disorder  A. The development of emotional or behavioral symptoms in response to an identifiable stressor(s) occurring within 3 months of the onset of the stressor(s)  B. These symptoms or behaviors are clinically significant, as evidenced by one or both of the following:       - Marked distress that is out of proportion to the severity/intensity of the stressor (with consideration for external context & culture)       - Significant impairment in social, occupational, or other important areas of functioning  C. The stress-related disturbance does not meet criteria for another disorder & is not not an exacerbation of another mental disorder  D. The symptoms do not  "represent normal bereavement  E. Once the stressor or its consequences have terminated, the symptoms do not persist for more than an additional 6 months       * Adjustment Disorder with Mixed Anxiety and Depressed Mood: The predominant manifestation is a combination of depression and anxiety      DSM5 Diagnoses: (Sustained by DSM5 Criteria Listed Above)  Diagnoses: Adjustment Disorders  309.28 (F43.23) With mixed anxiety and depressed mood  Psychosocial & Contextual Factors: served in , in school, complex grief (father, uncle, grandmother all passed within past few years)  WHODAS 2.0 (12 item):   WHODAS 2.0 Total Score 11/19/2021   Total Score 12   Total Score MyChart 12     Intervention:   Completed through review of safety issues and safety interventions and Completed Safety plan  Collateral Reports Completed:  Not Applicable      PLAN: (Homework, other):  1. Provider will continue Diagnostic Assessment.      2. Provider recommended the following referrals: none.      3.  Suicide Risk and Safety Concerns were assessed for Love Ricks.     In the coming weeks, patient will use his safety plan.  Should there be a change in intensity or severity of symptoms related to SI or SIB, patient will call 911, or go to local ED for evaluation.          SARAH Centeno, SW  November 19, 2021    This note has been reviewed and I agree with the plan of care. This note is co-signed by SARAH Cruz, [facility], Supervisor, on: November 19, 2021                                                   Love Ricks     SAFETY PLAN:  Warning signs / cues (Thoughts, images, mood, situation, behavior) that a crisis may be developing:    Thoughts: \"I'm a burden\" and \"I just want this to end\"    Thinking Processes: intrusive thoughts (bothersome, unwanted thoughts that come out of nowhere): self-harm    Mood: worsening depression    Behaviors: not sleeping enough   Coping strategies - Things I can do to take my mind off of my " "problems without contacting another person (relaxation technique, physical activity):    Distress Tolerance Strategies:  change body temperature (ice pack/cold water) , paced breathing/progressive muscle relaxation and watch a funny show    Physical Activities: go for a walk, deep breathing and work out, martial arts    Focus on helpful thoughts:  \"I will get through this\" and \"I have so much to live for.\"    Remind myself of people and things that are important to me and worth living for:     \"traveling/vacation, looking forward to new episodes of my favorite shows, finishing my classes and finding a job that I care about, jason diving.\"  :Making the environment safe:     remove access to firearms: disassembling my firearms and locking them in a secured place   Step 7: Professionals or agencies I can contact during a crisis:    [facility] Daytime Number: [phone]    Suicide Prevention Lifeline: 1-305-774-TALK (8255)    Crisis Text Line Service (available 24 hours a day, 7 days a week): Text [place] to 063165  [facility] Crisis Services: 1-124.320.1899    Call 911 or go to my nearest emergency department.   I helped develop this safety plan and agree to use it when needed.  I have been given a copy of this plan.    Today s date:  11/19/2021  Adapted from Safety Plan Template 2008 Cristy Zamarripa and Devang Sales is reprinted with the express permission of the authors.  No portion of the Safety Plan Template may be reproduced without the express, written permission.  You can contact the authors at [email or web address] or [email or web address].          Answers for HPI/ROS submitted by the patient on 11/19/2021  If you checked off any problems, how difficult have these problems made it for you to do your work, take care of things at home, or get along with other people?: Somewhat difficult  PHQ9 TOTAL SCORE: 12  DAPHNE 7 TOTAL SCORE: 5      "

## 2021-11-20 ASSESSMENT — ANXIETY QUESTIONNAIRES: GAD7 TOTAL SCORE: 5

## 2021-11-20 ASSESSMENT — PATIENT HEALTH QUESTIONNAIRE - PHQ9: SUM OF ALL RESPONSES TO PHQ QUESTIONS 1-9: 12

## 2021-12-03 ENCOUNTER — VIRTUAL VISIT (OUTPATIENT)
Dept: BEHAVIORAL HEALTH | Facility: CLINIC | Age: 26
End: 2021-12-03
Payer: OTHER GOVERNMENT

## 2021-12-03 DIAGNOSIS — F43.23 ADJUSTMENT DISORDER WITH MIXED ANXIETY AND DEPRESSED MOOD: Primary | ICD-10-CM

## 2021-12-03 PROCEDURE — 90834 PSYTX W PT 45 MINUTES: CPT | Mod: GT,95

## 2021-12-03 NOTE — PROGRESS NOTES
[facility]   Mental Health & Addiction Services     Progress Note - Initial Visit    Patient  Name:  Love Ricks Date: 12/3/2021         Service Type: Individual     Visit Start Time: 1:30pm  Visit End Time: 2:20pm    Visit #: 2    Attendees: Client attended alone    Service Modality:  Video Visit:      Provider verified identity through the following two step process.  Patient provided:  Patient photo and Patient     Telemedicine Visit: The patient's condition can be safely assessed and treated via synchronous audio and visual telemedicine encounter.      Reason for Telemedicine Visit: Patient has requested telehealth visit    Originating Site (Patient Location): Patient's home    Distant Site (Provider Location): Provider Remote Setting- Home Office    Consent:  The patient/guardian has verbally consented to: the potential risks and benefits of telemedicine (video visit) versus in person care; bill my insurance or make self-payment for services provided; and responsibility for payment of non-covered services.     Patient would like the video invitation sent by:  My Chart    Mode of Communication:  Video Conference via Amwell    As the provider I attest to compliance with applicable laws and regulations related to telemedicine.       DATA:   Interactive Complexity: No   Crisis: No     Presenting Concerns/  Current Stressors:   Today patient and therapist continued with the diagnostic assessment but were unable to complete due to time constraints.  Therapist assessed for risk and safety. Patient agreed to continue using his safety plan. Should there be a change in intensity or severity of symptoms related to SI or SIB, patient will call 911, or go to local ED for evaluation.  Patient and therapist will finish the DA during next session.        ASSESSMENT:  Mental Status Assessment:  Appearance:   Appropriate   Eye Contact:   Good   Psychomotor Behavior: Normal   Attitude:   Cooperative   "Friendly  Orientation:   All  Speech   Rate / Production: Normal/ Responsive   Volume:  Normal   Mood:    Normal  Affect:    Appropriate   Thought Content:  Clear   Thought Form:  Coherent   Insight:    Good       Safety Issues and Plan for Safety and Risk Management:     [place] Suicide Severity Rating Scale (Lifetime/Recent)  [place] Suicide Severity Rating (Lifetime/Recent) 1/4/2021 11/19/2021   1. Wish to be Dead (Lifetime) Yes Yes   Wish to be Dead Description (Lifetime) (No Data) -   Comments feeling overwelmed with multiple stressors -   1. Wish to be Dead (Recent) No Yes   2. Non-Specific Active Suicidal Thoughts (Lifetime) No Yes   2. Non-Specific Active Suicidal Thoughts (Recent) No Yes   3. Active Suicidal Ideation with any Methods (Not Plan) Without Intent to Act (Lifetime) No Yes   3. Active Suicidal Ideation with any Methods (Not Plan) Without Intent to Act (Recent) No Yes   Active Suicidal Ideation with any Methods (Not Plan) Description (Recent) - \"I have thought about shooting myself with a gun, but I never made a plan and would never actually do it.  I have so much to live for.\"   4. Active Suicidal Ideation with Some Intent to Act, Without Specific Plan (Lifetime) No No   4. Active Suicidal Ideation with Some Intent to Act, Without Specific Plan (Recent) No No   5. Active Suicidal Ideation with Specific Plan and Intent (Lifetime) No No   5. Active Suicidal Ideation with Specific Plan and Intent (Recent) No No   Most Severe Ideation Rating (Lifetime) - 3   Frequency (Lifetime) - 2   Duration (Lifetime) - 1   Protective Factors  (Lifetime) - 1   Most Severe Ideation Rating (Past Month) - 3   Duration (Past Month) - 1   Controllability (Past Month) - 1   Reasons for Ideation (Past Month) - 4   Actual Attempt (Past 3 Months) - No   Has subject engaged in non-suicidal self-injurious behavior? (Lifetime) - No   Has subject engaged in non-suicidal self-injurious behavior? (Past 3 Months) - No "   Interrupted Attempts (Lifetime) - No     Patient denies current fears or concerns for personal safety.  Patient reports the following current or recent suicidal ideation or behaviors: intrusive thoughts of active SI.  Patient denies current or recent homicidal ideation or behaviors.  Patient denies current or recent self injurious behavior or ideation.  Patient denies other safety concerns.  A safety and risk management plan has been developed including: Patient consented to co-developed safety plan.  Safety and risk management plan was completed.  Patient agreed to use safety plan should any safety concerns arise.  A copy was given to the patient.  Patient reports there are firearms in the house. The firearms are secured in a locked space.     Diagnostic Criteria:  Adjustment Disorder  A. The development of emotional or behavioral symptoms in response to an identifiable stressor(s) occurring within 3 months of the onset of the stressor(s)  B. These symptoms or behaviors are clinically significant, as evidenced by one or both of the following:       - Marked distress that is out of proportion to the severity/intensity of the stressor (with consideration for external context & culture)       - Significant impairment in social, occupational, or other important areas of functioning  C. The stress-related disturbance does not meet criteria for another disorder & is not not an exacerbation of another mental disorder  D. The symptoms do not represent normal bereavement  E. Once the stressor or its consequences have terminated, the symptoms do not persist for more than an additional 6 months       * Adjustment Disorder with Mixed Anxiety and Depressed Mood: The predominant manifestation is a combination of depression and anxiety      DSM5 Diagnoses: (Sustained by DSM5 Criteria Listed Above)  Diagnoses: Adjustment Disorders  309.28 (F43.23) With mixed anxiety and depressed mood  Psychosocial & Contextual Factors: served  "in , in school, complex grief (father, uncle, grandmother all passed within past few years)  WHODAS 2.0 (12 item):   WHODAS 2.0 Total Score 11/19/2021   Total Score 12   Total Score MyChart 12     Intervention:   Completed through review of safety issues and safety interventions and Completed Safety plan  Collateral Reports Completed:  Not Applicable      PLAN: (Homework, other):  1. Provider will continue Diagnostic Assessment.      2. Provider recommended the following referrals: none.      3.  Suicide Risk and Safety Concerns were assessed for Love Ricks.     In the coming weeks, patient will use his safety plan.  Should there be a change in intensity or severity of symptoms related to SI or SIB, patient will call 911, or go to local ED for evaluation.          SARAH Centeno, SW  12/3/2021    This note has been reviewed and I agree with the plan of care. This note is co-signed by SARAH Cruz, [facility], Supervisor, on: December 7, 2021                                                   Love Ricks     SAFETY PLAN:  Warning signs / cues (Thoughts, images, mood, situation, behavior) that a crisis may be developing:    Thoughts: \"I'm a burden\" and \"I just want this to end\"    Thinking Processes: intrusive thoughts (bothersome, unwanted thoughts that come out of nowhere): self-harm    Mood: worsening depression    Behaviors: not sleeping enough   Coping strategies - Things I can do to take my mind off of my problems without contacting another person (relaxation technique, physical activity):    Distress Tolerance Strategies:  change body temperature (ice pack/cold water) , paced breathing/progressive muscle relaxation and watch a funny show    Physical Activities: go for a walk, deep breathing and work out, martial arts    Focus on helpful thoughts:  \"I will get through this\" and \"I have so much to live for.\"    Remind myself of people and things that are important to me and worth living for:  " "   \"traveling/vacation, looking forward to new episodes of my favorite shows, finishing my classes and finding a job that I care about, jason diving.\"  :Making the environment safe:     remove access to firearms: disassembling my firearms and locking them in a secured place   Step 7: Professionals or agencies I can contact during a crisis:    [facility] Daytime Number: [phone]    Suicide Prevention Lifeline: 1-871-992-GKTO (8353)    Crisis Text Line Service (available 24 hours a day, 7 days a week): Text [place] to 076762  [facility] Crisis Services: 1-179.362.6450    Call 911 or go to my nearest emergency department.   I helped develop this safety plan and agree to use it when needed.  I have been given a copy of this plan.    Today s date:  11/19/2021  Adapted from Safety Plan Template 2008 Cristy Zamarripa and Devang Sales is reprinted with the express permission of the authors.  No portion of the Safety Plan Template may be reproduced without the express, written permission.  You can contact the authors at [email or web address] or [email or web address].          Answers for HPI/ROS submitted by the patient on 11/19/2021  If you checked off any problems, how difficult have these problems made it for you to do your work, take care of things at home, or get along with other people?: Somewhat difficult  PHQ9 TOTAL SCORE: 12  DAPHNE 7 TOTAL SCORE: 5    "

## 2021-12-29 ENCOUNTER — VIRTUAL VISIT (OUTPATIENT)
Dept: BEHAVIORAL HEALTH | Facility: CLINIC | Age: 26
End: 2021-12-29
Payer: OTHER GOVERNMENT

## 2021-12-29 DIAGNOSIS — F32.1 CURRENT MODERATE EPISODE OF MAJOR DEPRESSIVE DISORDER WITHOUT PRIOR EPISODE (H): Primary | ICD-10-CM

## 2021-12-29 PROCEDURE — 90834 PSYTX W PT 45 MINUTES: CPT | Mod: GT,95
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
## 2022-03-18 ENCOUNTER — OFFICE VISIT (OUTPATIENT)
Dept: FAMILY MEDICINE | Facility: CLINIC | Age: 27
End: 2022-03-18
Payer: OTHER GOVERNMENT

## 2022-03-18 VITALS
DIASTOLIC BLOOD PRESSURE: 67 MMHG | RESPIRATION RATE: 16 BRPM | WEIGHT: 191 LBS | TEMPERATURE: 98.1 F | BODY MASS INDEX: 29.28 KG/M2 | HEART RATE: 81 BPM | OXYGEN SATURATION: 99 % | SYSTOLIC BLOOD PRESSURE: 120 MMHG

## 2022-03-18 DIAGNOSIS — Z84.89 FAMILY HISTORY OF SUDDEN DEATH IN FATHER: ICD-10-CM

## 2022-03-18 DIAGNOSIS — Z00.00 HEALTHCARE MAINTENANCE: ICD-10-CM

## 2022-03-18 DIAGNOSIS — T30.0 THERMAL BURN: ICD-10-CM

## 2022-03-18 DIAGNOSIS — M54.41 ACUTE RIGHT-SIDED LOW BACK PAIN WITH RIGHT-SIDED SCIATICA: ICD-10-CM

## 2022-03-18 DIAGNOSIS — R55 PRE-SYNCOPE: Primary | ICD-10-CM

## 2022-03-18 DIAGNOSIS — N45.1 EPIDIDYMITIS: ICD-10-CM

## 2022-03-18 LAB
ALBUMIN UR-MCNC: NEGATIVE MG/DL
APPEARANCE UR: CLEAR
BACTERIA #/AREA URNS HPF: ABNORMAL /HPF
BILIRUB UR QL STRIP: NEGATIVE
COLOR UR AUTO: YELLOW
ERYTHROCYTE [DISTWIDTH] IN BLOOD BY AUTOMATED COUNT: 11.3 % (ref 10–15)
GLUCOSE UR STRIP-MCNC: NEGATIVE MG/DL
HCT VFR BLD AUTO: 46.1 % (ref 40–53)
HGB BLD-MCNC: 14.9 G/DL (ref 13.3–17.7)
HGB UR QL STRIP: NEGATIVE
KETONES UR STRIP-MCNC: NEGATIVE MG/DL
LEUKOCYTE ESTERASE UR QL STRIP: NEGATIVE
MCH RBC QN AUTO: 29.6 PG (ref 26.5–33)
MCHC RBC AUTO-ENTMCNC: 32.3 G/DL (ref 31.5–36.5)
MCV RBC AUTO: 92 FL (ref 78–100)
NITRATE UR QL: NEGATIVE
PH UR STRIP: 7 [PH] (ref 5–8)
PLATELET # BLD AUTO: 350 10E3/UL (ref 150–450)
RBC # BLD AUTO: 5.03 10E6/UL (ref 4.4–5.9)
RBC #/AREA URNS AUTO: ABNORMAL /HPF
SP GR UR STRIP: 1.02 (ref 1–1.03)
SQUAMOUS #/AREA URNS AUTO: ABNORMAL /LPF
UROBILINOGEN UR STRIP-ACNC: 0.2 E.U./DL
WBC # BLD AUTO: 11.1 10E3/UL (ref 4–11)
WBC #/AREA URNS AUTO: ABNORMAL /HPF

## 2022-03-18 PROCEDURE — 90651 9VHPV VACCINE 2/3 DOSE IM: CPT | Performed by: FAMILY MEDICINE

## 2022-03-18 PROCEDURE — 36415 COLL VENOUS BLD VENIPUNCTURE: CPT | Performed by: FAMILY MEDICINE

## 2022-03-18 PROCEDURE — 87491 CHLMYD TRACH DNA AMP PROBE: CPT | Performed by: FAMILY MEDICINE

## 2022-03-18 PROCEDURE — 0064A COVID-19,PF,MODERNA (18+ YRS BOOSTER .25ML): CPT | Performed by: FAMILY MEDICINE

## 2022-03-18 PROCEDURE — 81001 URINALYSIS AUTO W/SCOPE: CPT | Performed by: FAMILY MEDICINE

## 2022-03-18 PROCEDURE — 93010 ELECTROCARDIOGRAM REPORT: CPT | Performed by: INTERNAL MEDICINE

## 2022-03-18 PROCEDURE — 85027 COMPLETE CBC AUTOMATED: CPT | Performed by: FAMILY MEDICINE

## 2022-03-18 PROCEDURE — 93005 ELECTROCARDIOGRAM TRACING: CPT | Performed by: FAMILY MEDICINE

## 2022-03-18 PROCEDURE — 99215 OFFICE O/P EST HI 40 MIN: CPT | Mod: 25 | Performed by: FAMILY MEDICINE

## 2022-03-18 PROCEDURE — 91306 COVID-19,PF,MODERNA (18+ YRS BOOSTER .25ML): CPT | Performed by: FAMILY MEDICINE

## 2022-03-18 PROCEDURE — 90471 IMMUNIZATION ADMIN: CPT | Performed by: FAMILY MEDICINE

## 2022-03-18 PROCEDURE — 87591 N.GONORRHOEAE DNA AMP PROB: CPT | Performed by: FAMILY MEDICINE

## 2022-03-18 RX ORDER — DOXYCYCLINE HYCLATE 100 MG
100 TABLET ORAL 2 TIMES DAILY
Qty: 20 TABLET | Refills: 0 | Status: SHIPPED | OUTPATIENT
Start: 2022-03-18 | End: 2022-06-05
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
## 2022-03-19 LAB
ATRIAL RATE - MUSE: 75 BPM
C TRACH DNA SPEC QL PROBE+SIG AMP: NEGATIVE
DIASTOLIC BLOOD PRESSURE - MUSE: NORMAL MMHG
INTERPRETATION ECG - MUSE: NORMAL
N GONORRHOEA DNA SPEC QL NAA+PROBE: NEGATIVE
P AXIS - MUSE: 63 DEGREES
PR INTERVAL - MUSE: 156 MS
QRS DURATION - MUSE: 86 MS
QT - MUSE: 354 MS
QTC - MUSE: 395 MS
R AXIS - MUSE: 71 DEGREES
SYSTOLIC BLOOD PRESSURE - MUSE: NORMAL MMHG
T AXIS - MUSE: 28 DEGREES
VENTRICULAR RATE- MUSE: 75 BPM

## 2022-03-28 ENCOUNTER — OFFICE VISIT (OUTPATIENT)
Dept: CARDIOLOGY | Facility: CLINIC | Age: 27
End: 2022-03-28
Payer: OTHER GOVERNMENT

## 2022-03-28 VITALS
HEART RATE: 82 BPM | WEIGHT: 186 LBS | RESPIRATION RATE: 16 BRPM | DIASTOLIC BLOOD PRESSURE: 72 MMHG | SYSTOLIC BLOOD PRESSURE: 110 MMHG | HEIGHT: 67 IN | BODY MASS INDEX: 29.19 KG/M2

## 2022-03-28 DIAGNOSIS — R55 PRE-SYNCOPE: ICD-10-CM

## 2022-03-28 DIAGNOSIS — Z82.41 FAMILY HISTORY OF SUDDEN CARDIAC DEATH: Primary | ICD-10-CM

## 2022-03-28 PROCEDURE — 99204 OFFICE O/P NEW MOD 45 MIN: CPT | Performed by: INTERNAL MEDICINE

## 2022-03-28 NOTE — LETTER
"3/28/2022    Russell Abraham MD  [address] [facility] [address]    RE: Love Ricks       Dear Colleague,     I had the pleasure of seeing Love Ricks in the [facility].       [facility]   [address], [place] [address]   [email or web address]   OFFICE: [phone]          Thank you Dr. Abraham for asking the [facility] team to participate in the care of your patient, Love Ricks.     Impression and Plan     1.  Family history of early sudden death in father/presyncope and lightheadedness.  As noted below, Omi has a family history of early sudden cardiac death in his father.  Details are not entirely clear to patient though apparently he had  in his 40s apparently in his sleep.  The details are not entirely clear and no autopsy in the like was performed.  Patient himself reports that he has had some intermittent lightheadedness after working out.  He feels at times he may \"pass out\", but never has fully lost consciousness and the like.  He denies any associated subjective palpitations.  He has not had any mily loss of consciousness and the like.  Plan:    Plan to obtain an echocardiogram to rule out any evidence of structural heart disease such as hypertrophic cardiomyopathy given father's history of sudden death.    Plan to also obtain a 1 month one-patch monitor to see if there is any associated rhythm disturbance with his symptoms of presyncope/lightheadedness.    Follow-up and further recommendations pending test results.    30 minutes spent reviewing prior records (including documentation, laboratory studies, cardiac testing/imaging), interview with patient along with physical exam, planning, and subsequent documentation/crafting of note.       History of Present Illness    Once again I would like to thank you again for asking me to participate in the care of your patient, Love Ricks.  As you know, but to reiterate for my own " "Love dean Man is a 27 year old male with family history of early sudden cardiac death in his father.  Details are not entirely clear to patient though apparently he had  in his 40s apparently in his sleep.  The details are not entirely clear and no autopsy in the like was performed.  Patient himself reports that he has had some intermittent lightheadedness after working out.  He feels at times he may \"pass out\", but never has fully lost consciousness and the like.  He denies any associated subjective palpitations.  He has not had any mily loss of consciousness and the like.  He has had some similar spells not necessarily related with exertion as well.    Further review of systems is otherwise negative/noncontributory (medical record and 13 point review of systems reviewed as well and pertinent positives noted).       Cardiac Diagnostics     Twelve-lead ECG (personally reviewed) 2022: Sinus rhythm.  Normal ECG.    Twelve-lead ECG (personally reviewed) 2020: Sinus rhythm.  Normal ECG.       Physical Examination       /72 (BP Location: Left arm, Patient Position: Sitting, Cuff Size: Adult Large)   Pulse 82   Resp 16   Ht 1.71 m (5' 7.34\")   Wt 84.4 kg (186 lb)   BMI 28.84 kg/m          Wt Readings from Last 3 Encounters:   22 84.4 kg (186 lb)   22 86.6 kg (191 lb)   21 84.4 kg (186 lb)     The patient is alert and oriented times three. Sclerae are anicteric. Mucosal membranes are moist. Jugular venous pressure is normal. No significant adenopathy/thyromegally appreciated. Lungs are clear with good expansion. On cardiovascular exam, the patient has a regular S1 and S2. Abdomen is soft and non-tender. Extremities reveal no clubbing, cyanosis, or edema.       Imaging     Chest radiograph 2020:  1. The heart size and cardiomediastinal silhouette appear normal.   2. The lungs appear clear.   3. No radiographic findings of active   4. No bone " abnormalities.        Family History/Social History/Risk Factors   Patient does not smoke.  Family history reviewed, and family history includes Sudden Death in his father.          Family History Social History     Family History   Problem Relation Age of Onset     Sudden Death Father         Very early age        Social History     Socioeconomic History     Marital status: Single     Spouse name: Not on file     Number of children: Not on file     Years of education: Not on file     Highest education level: Not on file   Occupational History     Not on file   Tobacco Use     Smoking status: Former Smoker     Packs/day: 0.00     Smokeless tobacco: Never Used     Tobacco comment: quit 2018   Substance and Sexual Activity     Alcohol use: Not on file     Drug use: Not on file     Sexual activity: Not on file   Other Topics Concern     Not on file   Social History Narrative    Currently in [employer],  Serious girlfriend living in [place]     Social Determinants of Health     Financial Resource Strain: Not on file   Food Insecurity: Not on file   Transportation Needs: Not on file   Physical Activity: Not on file   Stress: Not on file   Social Connections: Not on file   Intimate Partner Violence: Not on file   Housing Stability: Not on file           Medications  Allergies   Current Outpatient Medications   Medication Sig Dispense Refill     doxycycline hyclate (VIBRA-TABS) 100 MG tablet Take 1 tablet (100 mg) by mouth 2 times daily 20 tablet 0       Allergies   Allergen Reactions     Primaquine Unknown          Lab Results    Chemistry/lipid CBC Cardiac Enzymes/BNP/TSH/INR   Recent Labs   Lab Test 11/11/20  1518   CHOL 197   HDL 43     No results for input(s): LDL in the last 35515 hours.  Recent Labs   Lab Test 11/11/20  1518      POTASSIUM 4.4   CHLORIDE 107   CO2 27   GLC 85   BUN 13   CR 1.41*   GFRESTIMATED >60   MARIANNA 9.7     Recent Labs   Lab Test 11/11/20  1518   CR 1.41*     No results for  input(s): A1C in the last 68591 hours.       Recent Labs   Lab Test 03/18/22  1553   WBC 11.1*   HGB 14.9   HCT 46.1   MCV 92        Recent Labs   Lab Test 03/18/22  1553 11/11/20  1518 10/07/19  0952   HGB 14.9 14.6 15.2    No results for input(s): TROPONINI in the last 54376 hours.  No results for input(s): BNP, NTBNPI, NTBNP in the last 09448 hours.  No results for input(s): TSH in the last 51157 hours.  No results for input(s): INR in the last 74769 hours.       Medications  Allergies   Current Outpatient Medications   Medication Sig Dispense Refill     doxycycline hyclate (VIBRA-TABS) 100 MG tablet Take 1 tablet (100 mg) by mouth 2 times daily 20 tablet 0      Allergies   Allergen Reactions     Primaquine Unknown        Lab Results   Lab Results   Component Value Date     11/11/2020    CO2 27 11/11/2020    BUN 13 11/11/2020     Lab Results   Component Value Date    WBC 11.1 03/18/2022    HGB 14.9 03/18/2022    HCT 46.1 03/18/2022    MCV 92 03/18/2022     03/18/2022     Lab Results   Component Value Date    CHOL 197 11/11/2020    HDL 43 11/11/2020                    Thank you for allowing me to participate in the care of your patient.      Sincerely,     Zeus Michael MD     [facility]  cc:   Russell Abraham MD  [address] [place] [address]

## 2022-03-28 NOTE — PROGRESS NOTES
"[facility]   [address], [place] [address]   [email or web address]   OFFICE: [phone]          Thank you Dr. Abraham for asking the [facility] team to participate in the care of your patient, Love Ricks.     Impression and Plan     1.  Family history of early sudden death in father/presyncope and lightheadedness.  As noted below, Omi has a family history of early sudden cardiac death in his father.  Details are not entirely clear to patient though apparently he had  in his 40s apparently in his sleep.  The details are not entirely clear and no autopsy in the like was performed.  Patient himself reports that he has had some intermittent lightheadedness after working out.  He feels at times he may \"pass out\", but never has fully lost consciousness and the like.  He denies any associated subjective palpitations.  He has not had any mily loss of consciousness and the like.  Plan:    Plan to obtain an echocardiogram to rule out any evidence of structural heart disease such as hypertrophic cardiomyopathy given father's history of sudden death.    Plan to also obtain a 1 month one-patch monitor to see if there is any associated rhythm disturbance with his symptoms of presyncope/lightheadedness.    Follow-up and further recommendations pending test results.    30 minutes spent reviewing prior records (including documentation, laboratory studies, cardiac testing/imaging), interview with patient along with physical exam, planning, and subsequent documentation/crafting of note.       History of Present Illness    Once again I would like to thank you again for asking me to participate in the care of your patient, Love Ricks.  As you know, but to reiterate for my own records, Love Ricks is a 27 year old male with family history of early sudden cardiac death in his father.  Details are not entirely clear to patient though apparently he had  in his " "40s apparently in his sleep.  The details are not entirely clear and no autopsy in the like was performed.  Patient himself reports that he has had some intermittent lightheadedness after working out.  He feels at times he may \"pass out\", but never has fully lost consciousness and the like.  He denies any associated subjective palpitations.  He has not had any mily loss of consciousness and the like.  He has had some similar spells not necessarily related with exertion as well.    Further review of systems is otherwise negative/noncontributory (medical record and 13 point review of systems reviewed as well and pertinent positives noted).       Cardiac Diagnostics     Twelve-lead ECG (personally reviewed) 18 March 2022: Sinus rhythm.  Normal ECG.    Twelve-lead ECG (personally reviewed) 11 November 2020: Sinus rhythm.  Normal ECG.       Physical Examination       /72 (BP Location: Left arm, Patient Position: Sitting, Cuff Size: Adult Large)   Pulse 82   Resp 16   Ht 1.71 m (5' 7.34\")   Wt 84.4 kg (186 lb)   BMI 28.84 kg/m          Wt Readings from Last 3 Encounters:   03/28/22 84.4 kg (186 lb)   03/18/22 86.6 kg (191 lb)   07/21/21 84.4 kg (186 lb)     The patient is alert and oriented times three. Sclerae are anicteric. Mucosal membranes are moist. Jugular venous pressure is normal. No significant adenopathy/thyromegally appreciated. Lungs are clear with good expansion. On cardiovascular exam, the patient has a regular S1 and S2. Abdomen is soft and non-tender. Extremities reveal no clubbing, cyanosis, or edema.       Imaging     Chest radiograph 11 November 2020:  1. The heart size and cardiomediastinal silhouette appear normal.   2. The lungs appear clear.   3. No radiographic findings of active   4. No bone abnormalities.        Family History/Social History/Risk Factors   Patient does not smoke.  Family history reviewed, and family history includes Sudden Death in his father.          Family History " Social History     Family History   Problem Relation Age of Onset     Sudden Death Father         Very early age        Social History     Socioeconomic History     Marital status: Single     Spouse name: Not on file     Number of children: Not on file     Years of education: Not on file     Highest education level: Not on file   Occupational History     Not on file   Tobacco Use     Smoking status: Former Smoker     Packs/day: 0.00     Smokeless tobacco: Never Used     Tobacco comment: quit 2018   Substance and Sexual Activity     Alcohol use: Not on file     Drug use: Not on file     Sexual activity: Not on file   Other Topics Concern     Not on file   Social History Narrative    Currently in [employer],  Serious girlfriend living in [place]     Social Determinants of Health     Financial Resource Strain: Not on file   Food Insecurity: Not on file   Transportation Needs: Not on file   Physical Activity: Not on file   Stress: Not on file   Social Connections: Not on file   Intimate Partner Violence: Not on file   Housing Stability: Not on file           Medications  Allergies   Current Outpatient Medications   Medication Sig Dispense Refill     doxycycline hyclate (VIBRA-TABS) 100 MG tablet Take 1 tablet (100 mg) by mouth 2 times daily 20 tablet 0       Allergies   Allergen Reactions     Primaquine Unknown          Lab Results    Chemistry/lipid CBC Cardiac Enzymes/BNP/TSH/INR   Recent Labs   Lab Test 11/11/20  1518   CHOL 197   HDL 43     No results for input(s): LDL in the last 12196 hours.  Recent Labs   Lab Test 11/11/20  1518      POTASSIUM 4.4   CHLORIDE 107   CO2 27   GLC 85   BUN 13   CR 1.41*   GFRESTIMATED >60   MARIANNA 9.7     Recent Labs   Lab Test 11/11/20  1518   CR 1.41*     No results for input(s): A1C in the last 22634 hours.       Recent Labs   Lab Test 03/18/22  1553   WBC 11.1*   HGB 14.9   HCT 46.1   MCV 92        Recent Labs   Lab Test 03/18/22  1553 11/11/20  1518  10/07/19  0952   HGB 14.9 14.6 15.2    No results for input(s): TROPONINI in the last 88312 hours.  No results for input(s): BNP, NTBNPI, NTBNP in the last 86139 hours.  No results for input(s): TSH in the last 23246 hours.  No results for input(s): INR in the last 20159 hours.       Medications  Allergies   Current Outpatient Medications   Medication Sig Dispense Refill     doxycycline hyclate (VIBRA-TABS) 100 MG tablet Take 1 tablet (100 mg) by mouth 2 times daily 20 tablet 0      Allergies   Allergen Reactions     Primaquine Unknown        Lab Results   Lab Results   Component Value Date     11/11/2020    CO2 27 11/11/2020    BUN 13 11/11/2020     Lab Results   Component Value Date    WBC 11.1 03/18/2022    HGB 14.9 03/18/2022    HCT 46.1 03/18/2022    MCV 92 03/18/2022     03/18/2022     Lab Results   Component Value Date    CHOL 197 11/11/2020    HDL 43 11/11/2020

## 2022-04-21 ENCOUNTER — HOSPITAL ENCOUNTER (OUTPATIENT)
Dept: PHYSICAL THERAPY | Facility: REHABILITATION | Age: 27
Discharge: HOME | End: 2022-04-21
Attending: FAMILY MEDICINE
Payer: OTHER GOVERNMENT

## 2022-04-21 DIAGNOSIS — M54.41 ACUTE RIGHT-SIDED LOW BACK PAIN WITH RIGHT-SIDED SCIATICA: ICD-10-CM

## 2022-04-21 PROCEDURE — 97110 THERAPEUTIC EXERCISES: CPT | Mod: GP

## 2022-04-21 PROCEDURE — 97161 PT EVAL LOW COMPLEX 20 MIN: CPT | Mod: GP

## 2022-05-06 ENCOUNTER — HOSPITAL ENCOUNTER (OUTPATIENT)
Dept: PHYSICAL THERAPY | Facility: REHABILITATION | Age: 27
Discharge: HOME | End: 2022-05-06
Payer: OTHER GOVERNMENT

## 2022-05-06 DIAGNOSIS — M54.41 ACUTE RIGHT-SIDED LOW BACK PAIN WITH RIGHT-SIDED SCIATICA: Primary | ICD-10-CM

## 2022-05-06 PROCEDURE — 97110 THERAPEUTIC EXERCISES: CPT | Mod: GP | Performed by: PHYSICAL THERAPIST

## 2022-06-05 ENCOUNTER — NURSE TRIAGE (OUTPATIENT)
Dept: NURSING | Facility: CLINIC | Age: 27
End: 2022-06-05
Payer: OTHER GOVERNMENT

## 2022-06-05 ENCOUNTER — OFFICE VISIT (OUTPATIENT)
Dept: FAMILY MEDICINE | Facility: CLINIC | Age: 27
End: 2022-06-05
Payer: OTHER GOVERNMENT

## 2022-06-05 VITALS
TEMPERATURE: 99.5 F | DIASTOLIC BLOOD PRESSURE: 76 MMHG | SYSTOLIC BLOOD PRESSURE: 122 MMHG | OXYGEN SATURATION: 97 % | RESPIRATION RATE: 16 BRPM | HEART RATE: 88 BPM

## 2022-06-05 DIAGNOSIS — R07.0 THROAT PAIN: Primary | ICD-10-CM

## 2022-06-05 LAB
DEPRECATED S PYO AG THROAT QL EIA: NEGATIVE
GROUP A STREP BY PCR: NOT DETECTED

## 2022-06-05 PROCEDURE — 99214 OFFICE O/P EST MOD 30 MIN: CPT | Performed by: FAMILY MEDICINE

## 2022-06-05 PROCEDURE — 87651 STREP A DNA AMP PROBE: CPT | Performed by: FAMILY MEDICINE

## 2022-06-05 NOTE — TELEPHONE ENCOUNTER
"Patient is calling about a sore throat that he's had for about a week.     No fever  No nausea or vomiting  No red or white spots on back of throat  No swollen lymph nodes    Moderate -Severe pain- rates  6-7/10  Staying hydrated    Covid test done on 6/2/22 and was negative    Per protocol, patient should be seen within 24 hours. Advised urgent care clinic. Care advice given. Patient verbalized understanding and agreed with plan. Will go to the [facility].    Rehana Wyatt RN  06/05/22 3:59 PM  [facility] Nurse Advisor      Reason for Disposition    Earache also present    Additional Information    Negative: Severe difficulty breathing (e.g., struggling for each breath, speaks in single words, stridor)    Negative: Sounds like a life-threatening emergency to the triager    Negative: [1] Diagnosed strep throat AND [2] taking antibiotic AND [3] symptoms continue    Negative: Throat culture results, call about    Negative: Productive cough is main symptom    Negative: Non-productive cough is main symptom    Negative: Hoarseness is main symptom    Negative: Runny nose is main symptom    Negative: [1] Drooling or spitting out saliva (because can't swallow) AND [2] normal breathing    Negative: Unable to open mouth completely    Negative: Fever > 104 F (40 C)    Negative: [1] Difficulty breathing AND [2] not severe    Negative: [1] Refuses to drink anything AND [2] for > 12 hours    Negative: [1] Drinking very little AND [2] dehydration suspected (e.g., no urine > 12 hours, very dry mouth, very lightheaded)    Negative: Patient sounds very sick or weak to the triager    Negative: SEVERE (e.g., excruciating) throat pain    Negative: [1] Pus on tonsils (back of throat) AND [2]  fever AND [3] swollen neck lymph nodes (\"glands\")    Negative: [1] Rash AND [2] widespread (especially chest and abdomen)    Protocols used: SORE THROAT-A-AH      "

## 2022-06-05 NOTE — PROGRESS NOTES
Assessment:       Throat pain    - Streptococcus A Rapid Screen w/Reflex to PCR - Clinic Collect  - Group A Streptococcus PCR Throat Swab  - amoxicillin-clavulanate (AUGMENTIN) 875-125 MG tablet  Dispense: 20 tablet; Refill: 0         Plan:     Patient with sore throat on the right side with some mild peritonsillar fullness.  I am concerned about early development of peritonsillar abscess.  Rapid strep screen is negative.  We will treat with Augmentin.  Recommend very close follow-up.  If symptoms getting worse or not improving over the next 2 days recommend following up in ED for possible need for I&D.  Patient is agreeable to this plan.    MEDICATIONS:   Orders Placed This Encounter   Medications     amoxicillin-clavulanate (AUGMENTIN) 875-125 MG tablet     Sig: Take 1 tablet by mouth 2 times daily for 10 days     Dispense:  20 tablet     Refill:  0       Patient Instructions   I am concerned about a possible early peritonsillar abscess.  I would like to start you on antibiotics.  I would like you to go the the ER if your symptoms are getting worse or not getting better in the next 2 days.      Subjective:       27 year old male presents for evaluation of a 1 week history of sore throat, mainly on the right side.  He has had some nasal congestion and cough which have overall improved quite a bit.  He has had a low-grade fever off and on over the past week.  It hurts quite a bit to swallow but he has been able to eat okay and drink fluids and manage his secretions.    Patient Active Problem List   Diagnosis     G6PD deficiency     Healthcare maintenance     LTBI (latent tuberculosis infection)     Atypical chest pain     Family history of sudden death in father     Hemorrhoids, unspecified hemorrhoid type     Acute right-sided low back pain with right-sided sciatica       No past medical history on file.    No past surgical history on file.    Current Outpatient Medications   Medication     amoxicillin-clavulanate  (AUGMENTIN) 875-125 MG tablet     doxycycline hyclate (VIBRA-TABS) 100 MG tablet     No current facility-administered medications for this visit.       Allergies   Allergen Reactions     Primaquine Unknown       Family History   Problem Relation Age of Onset     Sudden Death Father         Very early age       Social History     Socioeconomic History     Marital status: Single     Spouse name: None     Number of children: None     Years of education: None     Highest education level: None   Tobacco Use     Smoking status: Former Smoker     Packs/day: 0.00     Smokeless tobacco: Never Used     Tobacco comment: quit 2018   Social History Narrative    Currently in [employer],  Serious girlfriend living in [place]         Review of Systems  Pertinent items are noted in HPI.      Objective:     /76   Pulse 88   Temp 99.5  F (37.5  C) (Oral)   Resp 16   SpO2 97%      General appearance: Alert, appears clinically well without distress.  Voice.  Head: Normocephalic, without obvious abnormality, atraumatic  Throat: There is mild peritonsillar fullness on the right side without obvious fluctuance on palpation.  This is where patient is having his pain.  Mucous membranes are moist.  Uvula is midline.  There is no exudates seen.  No trismus.  Able to manage secretions without difficulty.  Neck: Mildly tender and enlarged anterior cervical lymphadenopathy on the right, none on the left.  Lungs: clear to auscultation bilaterally  Heart: Regular rate and rhythm without murmurs.      Results for orders placed or performed in visit on 06/05/22   Streptococcus A Rapid Screen w/Reflex to PCR - Clinic Collect     Status: Normal    Specimen: Throat; Swab   Result Value Ref Range    Group A Strep antigen Negative Negative       This note has been dictated using voice recognition software. Any grammatical or context distortions are unintentional and inherent to the software

## 2022-06-05 NOTE — PATIENT INSTRUCTIONS
I am concerned about a possible early peritonsillar abscess.  I would like to start you on antibiotics.  I would like you to go the the ER if your symptoms are getting worse or not getting better in the next 2 days.

## 2022-06-09 ENCOUNTER — VIRTUAL VISIT (OUTPATIENT)
Dept: FAMILY MEDICINE | Facility: CLINIC | Age: 27
End: 2022-06-09
Payer: OTHER GOVERNMENT

## 2022-06-09 DIAGNOSIS — R09.A2 SENSATION OF FOREIGN BODY IN THROAT: Primary | ICD-10-CM

## 2022-06-09 DIAGNOSIS — J35.8 TONSILLOLITH: ICD-10-CM

## 2022-06-09 DIAGNOSIS — Z11.59 NEED FOR HEPATITIS C SCREENING TEST: ICD-10-CM

## 2022-06-09 PROCEDURE — 99213 OFFICE O/P EST LOW 20 MIN: CPT | Mod: 95 | Performed by: FAMILY MEDICINE

## 2022-06-09 ASSESSMENT — PATIENT HEALTH QUESTIONNAIRE - PHQ9
SUM OF ALL RESPONSES TO PHQ QUESTIONS 1-9: 10
10. IF YOU CHECKED OFF ANY PROBLEMS, HOW DIFFICULT HAVE THESE PROBLEMS MADE IT FOR YOU TO DO YOUR WORK, TAKE CARE OF THINGS AT HOME, OR GET ALONG WITH OTHER PEOPLE: NOT DIFFICULT AT ALL
SUM OF ALL RESPONSES TO PHQ QUESTIONS 1-9: 10

## 2022-06-09 NOTE — LETTER
My Depression Action Plan  Name: Love Ricks   Date of Birth 1995  Date: 6/9/2022    My doctor: Russell Abraham   My clinic: [facility] [address] [place] [id number] [phone]          GREEN    ZONE   Good Control    What it looks like:     Things are going generally well. You have normal ups and downs. You may even feel depressed from time to time, but bad moods usually last less than a day.   What you need to do:  1. Continue to care for yourself (see self care plan)  2. Check your depression survival kit and update it as needed  3. Follow your physician s recommendations including any medication.  4. Do not stop taking medication unless you consult with your physician first.           YELLOW         ZONE Getting Worse    What it looks like:     Depression is starting to interfere with your life.     It may be hard to get out of bed; you may be starting to isolate yourself from others.    Symptoms of depression are starting to last most all day and this has happened for several days.     You may have suicidal thoughts but they are not constant.   What you need to do:     1. Call your care team. Your response to treatment will improve if you keep your care team informed of your progress. Yellow periods are signs an adjustment may need to be made.     2. Continue your self-care.  Just get dressed and ready for the day.  Don't give yourself time to talk yourself out of it.    3. Talk to someone in your support network.    4. Open up your Depression Self-Care Plan/Wellness Kit.           RED    ZONE Medical Alert - Get Help    What it looks like:     Depression is seriously interfering with your life.     You may experience these or other symptoms: You can t get out of bed most days, can t work or engage in other necessary activities, you have trouble taking care of basic hygiene, or basic responsibilities, thoughts of suicide or death that will not go away,  self-injurious behavior.     What you need to do:  1. Call your care team and request a same-day appointment. If they are not available (weekends or after hours) call your local crisis line, emergency room or 911.          Depression Self-Care Plan / Wellness Kit    Many people find that medication and therapy are helpful treatments for managing depression. In addition, making small changes to your everyday life can help to boost your mood and improve your wellbeing. Below are some tips for you to consider. Be sure to talk with your medical provider and/or behavioral health consultant if your symptoms are worsening or not improving.     Sleep   Sleep hygiene  means all of the habits that support good, restful sleep. It includes maintaining a consistent bedtime and wake time, using your bedroom only for sleeping or sex, and keeping the bedroom dark and free of distractions like a computer, smartphone, or television.     Develop a Healthy Routine  Maintain good hygiene. Get out of bed in the morning, make your bed, brush your teeth, take a shower, and get dressed. Don t spend too much time viewing media that makes you feel stressed. Find time to relax each day.    Exercise  Get some form of exercise every day. This will help reduce pain and release endorphins, the  feel good  chemicals in your brain. It can be as simple as just going for a walk or doing some gardening, anything that will get you moving.      Diet  Strive to eat healthy foods, including fruits and vegetables. Drink plenty of water. Avoid excessive sugar, caffeine, alcohol, and other mood-altering substances.     Stay Connected with Others  Stay in touch with friends and family members.    Manage Your Mood  Try deep breathing, massage therapy, biofeedback, or meditation. Take part in fun activities when you can. Try to find something to smile about each day.     Psychotherapy  Be open to working with a therapist if your provider recommends it.      Medication  Be sure to take your medication as prescribed. Most anti-depressants need to be taken every day. It usually takes several weeks for medications to work. Not all medicines work for all people. It is important to follow-up with your provider to make sure you have a treatment plan that is working for you. Do not stop your medication abruptly without first discussing it with your provider.    Crisis Resources   These hotlines are for both adults and children. They and are open 24 hours a day, 7 days a week unless noted otherwise.      National Suicide Prevention Lifeline   8-736-915-TALK (4019)      Crisis Text Line    www.crisistextline.org  Text HOME to 617845 from anywhere in the [place], anytime, about any type of crisis. A live, trained crisis counselor will receive the text and respond quickly.      [facility]uan Lifeline for LGBTQ Youth  A national crisis intervention and suicide lifeline for LGBTQ youth under 25. Provides a safe place to talk without judgement. Call 1-655.443.9934; text START to 224153 or visit www.thetrevorproject.org to talk to a trained counselor.      For [facility] crisis numbers, visit the [facility] website at:  https://[place].gov/dhs/people-we-serve/adults/health-care/mental-health/resources/crisis-contacts.jsp

## 2022-06-09 NOTE — PROGRESS NOTES
Omi is a 27 year old who is being evaluated via a billable video visit.      How would you like to obtain your AVS? Incuronhart  If the video visit is dropped, the invitation should be resent by: Send to e-mail at: [email or web address]  Will anyone else be joining your video visit? No   ____________________________    Virtual Visit - Video Encounter  [facility]  Family Medicine  Date of Service: 6/9/2022    Subjective:  Chief Complaint   Patient presents with     Throat Problem      History of Present Illness     Felt like URI at first  Sore throat didn't get better  Took antibiotics  Still has something down there  Tried to remove it with   Worse in morning    No allergies. No prior.    Depression - has a lot of stress.     Reason for visit:  Throat  Symptom onset:  1-2 weeks ago  Symptoms include:  Sore and feel like something is stuck  Symptom intensity:  Mild  Symptom progression:  Improving  Had these symptoms before:  No    He eats 0-1 servings of fruits and vegetables daily.He consumes 1 sweetened beverage(s) daily.He exercises with enough effort to increase his heart rate 60 or more minutes per day.  He exercises with enough effort to increase his heart rate 5 days per week.   He is taking medications regularly.    Today's PHQ-9         PHQ-9 Total Score: 10  PHQ-9 Q9 Thoughts of better off dead/self-harm past 2 weeks :   Not at all  How difficult have these problems made it for you to do your work, take care of things at home, or get along with other people: Not difficult at all     Objective:     GENERAL: Healthy, alert and no distress  EYES: Eyes grossly normal to inspection.  No discharge or erythema, or obvious scleral/conjunctival abnormalities.  RESP: No audible wheeze, cough, or visible cyanosis.  No visible retractions or increased work of breathing.    SKIN: Visible skin clear. No significant rash, abnormal pigmentation or lesions.  NEURO: Cranial nerves  grossly intact.  Mentation and speech appropriate for age.  PSYCH: Mentation appears normal, affect normal/bright, judgement and insight intact, normal speech and appearance well-groomed.   Office Visit on 06/05/2022   Component Date Value Ref Range Status     Group A Strep antigen 06/05/2022 Negative  Negative Final     Group A strep by PCR 06/05/2022 Not Detected  Not Detected Final     No results found.   Assessment & Plan:  1. Foreign body sensation. Suspicious for tonsilloliths. Referral to ENT for visualization, evaluation and treatment.       Order Summary                                                      Ban was seen today for throat problem.    Diagnoses and all orders for this visit:    Sensation of foreign body in throat  -     Adult ENT  Referral; Future    Need for hepatitis C screening test  -     Hepatitis C Screen Reflex to HCV RNA Quant and Genotype; Future    Tonsillolith        Future Appointments   Date Time Provider Department Center   6/9/2022  5:40 PM Lou Doyle MD [facility]       Completed by: Lou Doyle M.D., [facility]. 6/9/2022 5:07 PM.  This transcription uses voice recognition software, which may contain typographical errors.  ____________________________  Start visit: 5:20 PM  End visit: 5:31 PM   Video-Visit Details    Type of service:  Video Visit    Video End Time:5:31 PM    Originating Location (pt. Location): Home    Distant Location (provider location):  [facility]     Platform used for Video Visit: Logicbroker

## 2022-06-13 NOTE — PROGRESS NOTES
[facility]    Outpatient Physical Therapy Discharge Note  Patient: Love Ricks  : 1995    Beginning/End Dates of Reporting Period:  22 to 22    Referring Provider: Dr. Abraham     Therapy Diagnosis: lumbar spine pain      Client Self Report: Pt has been going to the gym but is still avoiding the leg press, able to do dead lift. Pt has been doing the prone exercises. No pain but slight discomfort in his back. Able to sit for quite a bit longer. High: 1/10    Objective Measurements:  Objective Measure: Lumbar AROM  Details: flexion: to ankles, Ext: WNL, Rotation: R WNL with slight pinch, L WNL, Lateral flexion: R WNL, L WNL  Objective Measure: Slump  Details: negative shweta                              Outcome Measures (most recent score):  Not filled out     Goals:  Goal Identifier HEP   Goal Description Pt. to demonstrate independence with HEP   Target Date 22   Date Met  22   Progress (detail required for progress note): MET     Goal Identifier Lifting   Goal Description Patient to tolerate lifting 135lb deadlift without increasing pain to allow for working out at gym and allow lifting objects for his [employer] duties   Target Date 22   Date Met  22   Progress (detail required for progress note): MET- was able to do 185# was prior doing 225#     Goal Identifier Sitting   Goal Description Patient to tolerate for 30 minutes without an increase in pain to allow patient to sit for classes at school   Target Date 22   Date Met  22   Progress (detail required for progress note): MET     Goal Identifier     Goal Description     Target Date     Date Met      Progress (detail required for progress note):       Goal Identifier     Goal Description     Target Date     Date Met      Progress (detail required for progress note):       Goal Identifier     Goal Description      Target Date     Date Met      Progress (detail required for progress note):       Goal Identifier     Goal Description     Target Date     Date Met      Progress (detail required for progress note):       Goal Identifier     Goal Description     Target Date     Date Met      Progress (detail required for progress note):             Plan:  Discharge from therapy.    Discharge:    Reason for Discharge: Patient has met all goals.    Equipment Issued: none    Discharge Plan: Patient to continue home program.

## 2022-06-13 NOTE — ADDENDUM NOTE
Encounter addended by: Jordana Campos, PT on: 6/13/2022 10:03 AM   Actions taken: Episode resolved, Pend clinical note, Flowsheet accepted, Clinical Note Signed with patient

## 2022-07-23 ENCOUNTER — HEALTH MAINTENANCE LETTER (OUTPATIENT)
Age: 27
End: 2022-07-23

## 2022-10-01 ENCOUNTER — HEALTH MAINTENANCE LETTER (OUTPATIENT)
Age: 27
End: 2022-10-01

## 2022-11-14 ENCOUNTER — OFFICE VISIT (OUTPATIENT)
Dept: INTERNAL MEDICINE | Facility: CLINIC | Age: 27
End: 2022-11-14
Payer: OTHER GOVERNMENT

## 2022-11-14 VITALS
BODY MASS INDEX: 29.84 KG/M2 | HEIGHT: 67 IN | SYSTOLIC BLOOD PRESSURE: 118 MMHG | DIASTOLIC BLOOD PRESSURE: 66 MMHG | WEIGHT: 190.1 LBS

## 2022-11-14 DIAGNOSIS — K12.0 CANKER SORE: Primary | ICD-10-CM

## 2022-11-14 PROCEDURE — 99213 OFFICE O/P EST LOW 20 MIN: CPT | Performed by: INTERNAL MEDICINE

## 2022-11-14 RX ORDER — TRIAMCINOLONE ACETONIDE 0.1 %
PASTE (GRAM) DENTAL 2 TIMES DAILY
Qty: 5 G | Refills: 3 | Status: SHIPPED | OUTPATIENT
Start: 2022-11-14 | End: 2024-04-02

## 2022-11-14 ASSESSMENT — PATIENT HEALTH QUESTIONNAIRE - PHQ9
SUM OF ALL RESPONSES TO PHQ QUESTIONS 1-9: 0
SUM OF ALL RESPONSES TO PHQ QUESTIONS 1-9: 0

## 2022-11-14 ASSESSMENT — PAIN SCALES - GENERAL: PAINLEVEL: MODERATE PAIN (4)

## 2022-11-14 NOTE — PROGRESS NOTES
"  Assessment & Plan     Canker sore  Typical singular canker sore aphthous ulcer in the left upper gum.  He has some bite marks in his lateral and right and left buccal mucosa.  No other suspicious lesions.  Reassured that these are benign and self-limiting he can use OTC Orajel prescription triamcinolone can also help hasten recovery but these are usually self resolving.  Did give him information about this.  - triamcinolone (KENALOG) 0.1 % paste  Dispense: 5 g; Refill: 3  He has started smoking again 2 to 3 cigarettes a month do recommend that he stop quitting smoking and if the sore does not heal within 2 weeks he needs to come for a follow-up             BMI:   Estimated body mass index is 29.77 kg/m  as calculated from the following:    Height as of this encounter: 1.702 m (5' 7\").    Weight as of this encounter: 86.2 kg (190 lb 1.6 oz).           Return if symptoms worsen or fail to improve, for Follow up.    Saskia Bowser MD  [facility]    Husam Braga is a 27 year old, presenting for the following health issues:  Dental Injury (Scrape from dentist on gum which is now turning white)      HPI     Follow up--saw the dentist and now has a possible infection on gum area on top left above molar  A couple of days later he felt sore , it hurts when he eats.  Swallowing problems.  No fever no other rash anywhere else      Review of Systems   Constitutional, HEENT, cardiovascular, pulmonary, gi and gu systems are negative, except as otherwise noted.      Objective    /66 (BP Location: Left arm, Patient Position: Sitting, Cuff Size: Adult Regular)   Ht 1.702 m (5' 7\")   Wt 86.2 kg (190 lb 1.6 oz)   BMI 29.77 kg/m    There is no height or weight on file to calculate BMI.  Physical Exam   He has a tiny oval-shaped 2 mm white canker sore above his left molars in the gumline.  With some mild surrounding erythema.  Rest of the oral mucosa is normal.                  Answers " for HPI/ROS submitted by the patient on 11/14/2022  PHQ9 TOTAL SCORE: 0      Answers for HPI/ROS submitted by the patient on 11/14/2022  PHQ9 TOTAL SCORE: 0

## 2023-03-31 ENCOUNTER — OFFICE VISIT (OUTPATIENT)
Dept: FAMILY MEDICINE | Facility: CLINIC | Age: 28
End: 2023-03-31
Payer: OTHER GOVERNMENT

## 2023-03-31 VITALS
RESPIRATION RATE: 18 BRPM | DIASTOLIC BLOOD PRESSURE: 75 MMHG | WEIGHT: 187.5 LBS | BODY MASS INDEX: 28.42 KG/M2 | OXYGEN SATURATION: 96 % | TEMPERATURE: 98.2 F | SYSTOLIC BLOOD PRESSURE: 122 MMHG | HEART RATE: 90 BPM | HEIGHT: 68 IN

## 2023-03-31 DIAGNOSIS — R21 RASH: Primary | ICD-10-CM

## 2023-03-31 PROCEDURE — 99213 OFFICE O/P EST LOW 20 MIN: CPT | Performed by: FAMILY MEDICINE

## 2023-03-31 RX ORDER — LORATADINE 10 MG/1
10 TABLET ORAL DAILY
Qty: 30 TABLET | Refills: 11 | Status: SHIPPED | OUTPATIENT
Start: 2023-03-31 | End: 2024-04-02

## 2023-03-31 RX ORDER — PERMETHRIN 50 MG/G
CREAM TOPICAL
Qty: 60 G | Refills: 0 | Status: SHIPPED | OUTPATIENT
Start: 2023-03-31 | End: 2023-04-07

## 2023-03-31 NOTE — PROGRESS NOTES
"OUTPATIENT VISIT NOTE                                                   Date of Visit: 3/31/2023     Chief Complaint   Patient presents with:  Derm Problem: All over the body, about a 1 week ago            History of Present Illness   Love STINSON Man is a 28 year old male c/o rash starting on various parts of body 1 week ago.  Itching.  Spreading.  No treatment.  No previous h/o of rash like this.  No one else in household has rash.  No recent travel.  No fever.  No other symptoms.    Works in tech.    Travel for the [employer] last week, stayed in [place].    Roommate doesn't have rash       MEDICATIONS   Current Outpatient Medications   Medication     loratadine (CLARITIN) 10 MG tablet     permethrin (ELIMITE) 5 % external cream     triamcinolone (KENALOG) 0.1 % paste     No current facility-administered medications for this visit.         SOCIAL HISTORY   Social History     Tobacco Use     Smoking status: Former     Packs/day: 0.00     Types: Cigarettes     Smokeless tobacco: Never     Tobacco comments:     quit 2018   Substance Use Topics     Alcohol use: Not on file           Physical Exam   Vitals:    03/31/23 1808   BP: 122/75   BP Location: Right arm   Patient Position: Sitting   Cuff Size: Adult Regular   Pulse: 90   Resp: 18   Temp: 98.2  F (36.8  C)   TempSrc: Oral   SpO2: 96%   Weight: 85 kg (187 lb 8 oz)   Height: 1.715 m (5' 7.5\")        GEN:  NAD  SKIN: scattered erythematous plaques with fine scale on arms, few on chest and back. Also in groin area         Assessment and Plan     Rash  Lesions on the arm in a linear fashion  Consider scabies vs pityriasis rosea.  Will treat for scabies with permethrin.    claritin for itching.    Referred to dermatology.  - loratadine (CLARITIN) 10 MG tablet  Dispense: 30 tablet; Refill: 11  - permethrin (ELIMITE) 5 % external cream  Dispense: 60 g; Refill: 0  - Adult Dermatology Referral                   Discussed signs / symptoms that warrant urgent / emergent " medical attention.     Recheck if worsening or not improving.       Adrianne Dawn MD          Pertinent History     The following portions of the patient's history were reviewed and updated as appropriate: allergies, current medications, past family history, past medical history, past social history, past surgical history and problem list.

## 2023-03-31 NOTE — PATIENT INSTRUCTIONS
Use cream as directed.    Take loratidine once daily for itching.    Apply cold if really itching.    Call [facility] for follow up [phone]

## 2023-04-06 DIAGNOSIS — R21 RASH: ICD-10-CM

## 2023-04-06 NOTE — TELEPHONE ENCOUNTER
Medication Question or Refill    Contacts       Type Contact Phone/Fax    04/06/2023 01:38 PM CDT Phone (Incoming) Omi Ricks (Self) [phone] (M)          What medication are you calling about (include dose and sig)?: permethrin (ELIMITE) 5 % external cream    Preferred Pharmacy:   [id number] - [place], [place] - [address] AT [facility] [address]  [address]  [place] [address]  Phone: [phone] Fax: [phone]      Controlled Substance Agreement on file:   CSA -- Patient Level:    CSA: None found at the patient level.       Who prescribed the medication?: Dr. Dawn    Do you need a refill? Yes    When did you use the medication last? 4/6/23    Patient offered an appointment? No    Do you have any questions or concerns?  No      Could we send this information to you in [facility] or would you prefer to receive a phone call?:   No preference   Okay to leave a detailed message?: Yes at Cell number on file:    Telephone Information:   Mobile [phone]

## 2023-04-07 ENCOUNTER — TELEPHONE (OUTPATIENT)
Dept: NURSING | Facility: CLINIC | Age: 28
End: 2023-04-07
Payer: OTHER GOVERNMENT

## 2023-04-07 RX ORDER — PERMETHRIN 50 MG/G
CREAM TOPICAL
Qty: 60 G | Refills: 0 | Status: SHIPPED | OUTPATIENT
Start: 2023-04-07 | End: 2024-04-02

## 2023-04-07 NOTE — TELEPHONE ENCOUNTER
Routing refill request to provider for review/approval because:  Drug not on the [facility] refill protocol     Last Written Prescription Date:  03/31/2023  Last Fill Quantity: 60 g,  # refills: 0   Last office visit provider:  03/31/2023     Requested Prescriptions   Pending Prescriptions Disp Refills     permethrin (ELIMITE) 5 % external cream 60 g 0     Sig: Massage into skin from head to foot.  Leave on for 8-14hrs and then wash off.  May repeat in 2 wks if needed.       There is no refill protocol information for this order          Rehana Wyatt RN 04/07/23 9:47 AM

## 2023-04-07 NOTE — TELEPHONE ENCOUNTER
Patient calling to follow-up on refill request for Permethrin cream that was prescribed to him on 3/31/23. Patient used the cream and saw some improvement in itchiness. Provider recommended patient follow-up with dermatology. Patient will call to schedule an appt with dermatology, but would like a refill in the meantime.    Refill request submitted to provider.    Rehana Wyatt RN  04/07/23 9:51 AM  [facility] Nurse Advisor

## 2023-08-06 ENCOUNTER — HEALTH MAINTENANCE LETTER (OUTPATIENT)
Age: 28
End: 2023-08-06

## 2024-03-29 ENCOUNTER — NURSE TRIAGE (OUTPATIENT)
Dept: FAMILY MEDICINE | Facility: CLINIC | Age: 29
End: 2024-03-29
Payer: OTHER GOVERNMENT

## 2024-03-29 NOTE — TELEPHONE ENCOUNTER
SEE IN OFFICE TODAY OR TOMORROW:   * You need to be examined. Let me give you an appointment.  * IF NO AVAILABLE OFFICE APPOINTMENTS: You need to be seen in an Urgent Care Center.  A nearby Urgent Care Center is often a good source of care.  * Patient declined UC or sooner appointment with another provider  * Appointment scheduled on 4/2/24 with PCP, Dr Abraham    Patient was in a drill for [employer] and a piece of flat wood fell over and hit patient's back of head   [facility]. Normal exam and imaging  Injury beginning of February 2024  Mild neck pain  Right mild ear pain, no drainage or redness  No hearing loss,but not tested  Mild headaches once or twice a week  No confusion  No problems with memory  No light sensitivity    Message sent to Yun HAWKINS as Dr Abraham out of clinic    Eunice Meier RN  [facility]    Reason for Disposition   After 3 days and headache persists    Additional Information   Negative: ACUTE NEUROLOGIC SYMPTOM and symptom present now   Negative: Knocked out (unconscious) > 1 minute   Negative: Seizure (convulsion) occurred  (Exception: Prior history of seizures and now alert and without Acute Neurologic Symptoms.)   Negative: Neck pain after dangerous injury (e.g., MVA, diving, trampoline, contact sports, fall > 10 feet or 3 meters)  (Exception: Neck pain began > 1 hour after injury.)   Negative: Major bleeding (actively dripping or spurting) that can't be stopped   Negative: Penetrating head injury (e.g., knife, gunshot wound, metal object)   Negative: Sounds like a life-threatening emergency to the triager   Negative: Diagnosed with a concussion within last 14 days   Negative: Can't remember what happened (amnesia)   Negative: Vomiting once or more   Negative: Watery or blood-tinged fluid dripping from the nose or ears   Negative: ACUTE NEUROLOGIC SYMPTOM and now fine   Negative: Knocked out (unconscious) < 1  "minute and now fine   Negative: SEVERE headache   Negative: Dangerous injury (e.g., MVA, diving, trampoline, contact sports, fall > 10 feet or 3 meters) or severe blow from hard object (e.g., golf club or baseball bat)   Negative: Large swelling or bruise (> 2 inches or 5 cm)   Negative: Skin is split open or gaping (length > 1/2 inch or 12 mm)   Negative: Bleeding won't stop after 10 minutes of direct pressure (using correct technique)   Negative: One or two 'black eyes' (bruising, purple color of eyelids), and onset within 24 hours of head injury   Negative: Taking Coumadin (warfarin) or other strong blood thinner, or known bleeding disorder (e.g., thrombocytopenia)   Negative: Age over 65 years with an area of head swelling or bruise   Negative: Sounds like a serious injury to the triager   Negative: Patient is confused or is an unreliable provider of information (e.g., dementia, severe intellectual disability, alcohol intoxication)   Negative: No prior tetanus shots (or is not fully vaccinated) and any wound (e.g., cut or scrape)   Negative: HIV positive or severe immunodeficiency (severely weak immune system) and DIRTY cut or scrape   Negative: Patient wants to be seen   Negative: Last tetanus shot > 5 years ago and DIRTY cut or scrape   Negative: Last tetanus shot > 10 years ago and CLEAN cut or scrape    Answer Assessment - Initial Assessment Questions  1. MECHANISM: \"How did the injury happen?\" For falls, ask: \"What height did you fall from?\" and \"What surface did you fall against?\"     Patient was in a drill for [employer] and a piece of flat wood fell over and hit patient's back of head   [facility]. Normal exam and imaging  Injury beginning of February 2024  Mild neck pain  Right mild ear pain, no drainage or redness  No hearing loss,but not tested  Mild headaches once or twice a week  No confusion  No problems with memory  No light sensitivity    2. " "ONSET: \"When did the injury happen?\" (Minutes or hours ago)       Beginning of February 2024    3. NEUROLOGIC SYMPTOMS: \"Was there any loss of consciousness?\" \"Are there any other neurological symptoms?\"       Headaches once or twice a week    4. MENTAL STATUS: \"Does the person know who they are, who you are, and where they are?\"     No confusion  No problems with memory    5. LOCATION: \"What part of the head was hit?\"       Back of head    6. SCALP APPEARANCE: \"What does the scalp look like? Is it bleeding now?\" If Yes, ask: \"Is it difficult to stop?\"       Normal scalp, no redness or wound    7. SIZE: For cuts, bruises, or swelling, ask: \"How large is it?\" (e.g., inches or centimeters)       N/A    8. PAIN: \"Is there any pain?\" If Yes, ask: \"How bad is it?\"  (e.g., Scale 1-10; or mild, moderate, severe)      Mild pain    9. TETANUS: For any breaks in the skin, ask: \"When was the last tetanus booster?\"      No breaks in skin    10. BLOOD THINNERS: \"Do you take any blood thinners?\" (e.g., aspirin, clopidogrel / Plavix, coumadin, heparin). Notes: Other strong blood thinners include: Arixtra (fondaparinux), Eliquis (apixaban), Pradaxa (dabigatran), and Xarelto (rivaroxaban).        None    11. OTHER SYMPTOMS: \"Do you have any other symptoms?\" (e.g., neck pain, vomiting)        Neck  and right ear pain    12. PREGNANCY: \"Is there any chance you are pregnant?\" \"When was your last menstrual period?\"          N/a    Protocols used: Head Injury-A-OH    "

## 2024-03-29 NOTE — TELEPHONE ENCOUNTER
Reason for Call:  Appointment Request    Patient requesting this type of appt:  acute visit    Requested provider: Russell Abraham    Reason patient unable to be scheduled: Not within requested timeframe    When does patient want to be seen/preferred time: 3-7 days    Comments: Patient would like an appointment with Dr. Abraham to evaluate a head injury he had a month ago. He is still experiencing headaches and neck pain.    Could we send this information to you in [facility] or would you prefer to receive a phone call?:   Patient would prefer a phone call   Okay to leave a detailed message?: Yes at Home number on file [phone] (home)    Call taken on 3/29/2024 at 10:59 AM by Kristin Loera

## 2024-03-29 NOTE — TELEPHONE ENCOUNTER
Ok to wait for next week.  Recommend same day for pt for next week if able.  Also recommend UC visit over the weekend if sxs worsen.  .sgin

## 2024-04-02 ENCOUNTER — OFFICE VISIT (OUTPATIENT)
Dept: FAMILY MEDICINE | Facility: CLINIC | Age: 29
End: 2024-04-02
Payer: OTHER GOVERNMENT

## 2024-04-02 VITALS
HEART RATE: 72 BPM | WEIGHT: 191.25 LBS | BODY MASS INDEX: 28.99 KG/M2 | OXYGEN SATURATION: 96 % | RESPIRATION RATE: 12 BRPM | TEMPERATURE: 99.2 F | HEIGHT: 68 IN | DIASTOLIC BLOOD PRESSURE: 76 MMHG | SYSTOLIC BLOOD PRESSURE: 108 MMHG

## 2024-04-02 DIAGNOSIS — S06.0X0S CONCUSSION WITHOUT LOSS OF CONSCIOUSNESS, SEQUELA (H): ICD-10-CM

## 2024-04-02 DIAGNOSIS — G44.209 TENSION HEADACHE: ICD-10-CM

## 2024-04-02 DIAGNOSIS — H92.01 OTALGIA, RIGHT: ICD-10-CM

## 2024-04-02 DIAGNOSIS — Z00.00 HEALTHCARE MAINTENANCE: Primary | ICD-10-CM

## 2024-04-02 PROCEDURE — 99213 OFFICE O/P EST LOW 20 MIN: CPT | Performed by: FAMILY MEDICINE

## 2024-04-02 ASSESSMENT — PATIENT HEALTH QUESTIONNAIRE - PHQ9
10. IF YOU CHECKED OFF ANY PROBLEMS, HOW DIFFICULT HAVE THESE PROBLEMS MADE IT FOR YOU TO DO YOUR WORK, TAKE CARE OF THINGS AT HOME, OR GET ALONG WITH OTHER PEOPLE: NOT DIFFICULT AT ALL
SUM OF ALL RESPONSES TO PHQ QUESTIONS 1-9: 1
SUM OF ALL RESPONSES TO PHQ QUESTIONS 1-9: 1

## 2024-04-02 NOTE — PROGRESS NOTES
Assessment/ Plan  1. Concussion without loss of consciousness, sequela (H24)  2 months ago, while patient was working with [employer].  Apparently was pushing something on a rail, a fairly large object toppled forward and hit him in the head.  No loss of consciousness but momentary confusion.  Has good recollection for the incident plus preceding and following time.  But after this developed abnormal vision in right eye.  [place] then sent to an emergency room where CT scan was done.  By this time she will symptoms resolved, CT was normal and he was sent home.  Has done fairly well ever since then.  Complains of mild headache and right ear pain.  Currently now and then gets a little bit of dizziness but no severe symptoms.    Discussed this, including possibility that ongoing slight dizziness and headache could be related to concussion.  For now, has had 2 months back at work and doing fairly well.  No intervention.    2. Tension headache  Very mild, top of head, began at the time of injury and it was scanned with negative CT.  Treat symptomatically, reassured probably tension type headache.    3. Otalgia, right  No problem seen with this.  Reassurance.    4. Healthcare maintenance  Declines COVID-vaccine.     Body mass index is 28.82 kg/m .    Subjective  CC:  chief complaint  HPI:  Patient is an [employer].  Reports on Tuesday weekend, pushing some sort of part, a fairly heavy object fell on his head.  Simply toppled over.  No loss of consciousness.  Story as above.  Patient Active Problem List   Diagnosis    G6PD deficiency    Healthcare maintenance    LTBI (latent tuberculosis infection)    Atypical chest pain    Family history of sudden death in father    Hemorrhoids, unspecified hemorrhoid type    Acute right-sided low back pain with right-sided sciatica     Current medications reviewed as follows:  No current outpatient medications on file.     No current facility-administered  "medications for this visit.      History   Smoking Status    Former    Packs/day: 0.00    Types: Cigarettes   Smokeless Tobacco    Never     Social History     Social History Narrative    Currently in [employer],  Serious girlfriend living in [place]     Patient Care Team:  Russell Abraham MD as PCP - General (Family Medicine)  Saskia Bowser MD as Assigned PCP      Objective  Physical Exam  Vitals:    04/02/24 1618   BP: 108/76   Pulse: 72   Resp: 12   Temp: 99.2  F (37.3  C)   TempSrc: Oral   SpO2: 96%   Weight: 86.8 kg (191 lb 4 oz)   Height: 1.735 m (5' 8.31\")     Cranial nerves II through XII intact, no reproducible tenderness on his head, no significant tenderness on palpation of the back of his neck.  Good range of motion.    TMs appear normal.  TMJ is normal.  Thought and speech content is clear.  Diagnostics  None    Please note: Voice recognition software was used in this dictation.  It may therefore contain typographical errors.      Answers submitted by the patient for this visit:  Patient Health Questionnaire (Submitted on 4/2/2024)  If you checked off any problems, how difficult have these problems made it for you to do your work, take care of things at home, or get along with other people?: Not difficult at all  PHQ9 TOTAL SCORE: 1  General Questionnaire (Submitted on 4/2/2024)  Chief Complaint: Chronic problems general questions HPI Form  How many servings of fruits and vegetables do you eat daily?: 2-3  On average, how many sweetened beverages do you drink each day (Examples: soda, juice, sweet tea, etc.  Do NOT count diet or artificially sweetened beverages)?: 0  How many minutes a day do you exercise enough to make your heart beat faster?: 30 to 60  How many days a week do you exercise enough to make your heart beat faster?: 4  How many days per week do you miss taking your medication?: 0  General Concern (Submitted on 4/2/2024)  Chief Complaint: Chronic problems general questions HPI " Form  What is the reason for your visit today?: Head injust checkup  When did your symptoms begin?: More than a month  What are your symptoms?: head,neck and earache  How would you describe these symptoms?: Mild  Are your symptoms:: Improving  Have you had these symptoms before?: No

## 2024-09-08 ENCOUNTER — HEALTH MAINTENANCE LETTER (OUTPATIENT)
Age: 29
End: 2024-09-08

## 2024-10-31 ENCOUNTER — OFFICE VISIT (OUTPATIENT)
Dept: INTERNAL MEDICINE | Facility: CLINIC | Age: 29
End: 2024-10-31

## 2024-10-31 VITALS
HEART RATE: 78 BPM | BODY MASS INDEX: 28.34 KG/M2 | OXYGEN SATURATION: 95 % | RESPIRATION RATE: 13 BRPM | TEMPERATURE: 98.6 F | HEIGHT: 68 IN | DIASTOLIC BLOOD PRESSURE: 63 MMHG | WEIGHT: 187 LBS | SYSTOLIC BLOOD PRESSURE: 115 MMHG

## 2024-10-31 DIAGNOSIS — N50.811 TESTICULAR PAIN, RIGHT: ICD-10-CM

## 2024-10-31 DIAGNOSIS — N50.3 EPIDIDYMAL CYST: ICD-10-CM

## 2024-10-31 DIAGNOSIS — M54.50 LUMBAR BACK PAIN: Primary | ICD-10-CM

## 2024-10-31 PROCEDURE — 99214 OFFICE O/P EST MOD 30 MIN: CPT | Performed by: NURSE PRACTITIONER

## 2024-10-31 ASSESSMENT — PATIENT HEALTH QUESTIONNAIRE - PHQ9
10. IF YOU CHECKED OFF ANY PROBLEMS, HOW DIFFICULT HAVE THESE PROBLEMS MADE IT FOR YOU TO DO YOUR WORK, TAKE CARE OF THINGS AT HOME, OR GET ALONG WITH OTHER PEOPLE: SOMEWHAT DIFFICULT
SUM OF ALL RESPONSES TO PHQ QUESTIONS 1-9: 4
SUM OF ALL RESPONSES TO PHQ QUESTIONS 1-9: 4

## 2024-10-31 ASSESSMENT — ENCOUNTER SYMPTOMS: BACK PAIN: 1

## 2024-10-31 NOTE — PROGRESS NOTES
"  Assessment & Plan     Lumbar back pain  He has right sided lumbar back discomfort that occasionally radiates to mid spine.  It will also radiate down near the gluteus medius and will extend down into the left groin area and will occasionally affect his left testicle.    On exam, he has extensive tightness involving the right hip and is tender to range of motion testing.    I suspect his symptoms are likely due to hip pain.  We talked about home PT exercises and I gave him some YouTube videos to watch.  Testicular exam is largely unremarkable today, save for a cyst on the left testicle that appears benign    Testicular pain, right  Likely due to hip pain, as above    Epididymal cyst  Explained the benign nature of this cyst.  It does not appear to be growing.  No worrisome characteristics on exam.  Advised follow-up if cyst is getting larger, becoming painful, or if he starts to experience other urological problems, dysuria, etc.                BMI  Estimated body mass index is 28.18 kg/m  as calculated from the following:    Height as of this encounter: 1.735 m (5' 8.3\").    Weight as of this encounter: 84.8 kg (187 lb).             Husam Braga is a 29 year old, presenting for the following health issues:  Back Pain (X 2 months.) and Testicular Problem (Testicle pain, lump on left side. X 1 week.)      10/31/2024    11:12 AM   Additional Questions   Roomed by Francheska ANDERSON   Accompanied by caroline     Back Pain     History of Present Illness       Back Pain:  He presents for follow up of back pain. Patient's back pain is a new problem.    Original cause of back pain: work related injury  First noticed back pain: more than 1 month ago  Patient feels back pain: constantlyLocation of back pain:  Right lower back, left lower back and right middle of back  Description of back pain: dull ache  Back pain spreads: right buttocks and left buttocks    Since patient first noticed back pain, pain is: unchanged  Does back pain " "interfere with his job:  Yes  On a scale of 1-10 (10 being the worst), patient describes pain as:  4  What makes back pain worse: bending and sitting   Acupuncture: not tried  Acetaminophen: not tried  Activity or exercise: not helpful  Chiropractor:  Not tried  Cold: helpful  Heat: helpful  Massage: helpful  Muscle relaxants: helpful  NSAIDS: helpful  Opioids: not tried  Physical Therapy: not tried  Rest: helpful  Steroid Injection: not tried  Stretching: not helpful  Surgery: not tried  TENS unit: not tried  Topical pain relievers: not tried  Other healthcare providers patient is seeing for back pain: None   He is taking medications regularly.                     Objective    /63   Pulse 78   Temp 98.6  F (37  C)   Resp 13   Ht 1.735 m (5' 8.3\")   Wt 84.8 kg (187 lb)   SpO2 95%   BMI 28.18 kg/m    Body mass index is 28.18 kg/m .  Physical Exam   Leg lift test on the right reveals very stiff hip musculature.  Discomfort with palpation to the right paraspinal musculature extending down into the gluteus medius.  Urological exam is largely unremarkable, save for a pea-sized cystlike lesion posterior left testicle            Signed Electronically by: Fidel Carpenter, CNP    "

## 2024-11-27 ENCOUNTER — OFFICE VISIT (OUTPATIENT)
Dept: INTERNAL MEDICINE | Facility: CLINIC | Age: 29
End: 2024-11-27

## 2024-11-27 VITALS
RESPIRATION RATE: 16 BRPM | HEIGHT: 68 IN | BODY MASS INDEX: 28.08 KG/M2 | DIASTOLIC BLOOD PRESSURE: 80 MMHG | WEIGHT: 185.3 LBS | SYSTOLIC BLOOD PRESSURE: 125 MMHG | HEART RATE: 77 BPM | OXYGEN SATURATION: 97 %

## 2024-11-27 DIAGNOSIS — M54.50 LUMBAR BACK PAIN: Primary | ICD-10-CM

## 2024-11-27 PROCEDURE — 99213 OFFICE O/P EST LOW 20 MIN: CPT | Performed by: NURSE PRACTITIONER

## 2024-11-27 NOTE — PATIENT INSTRUCTIONS
I completed your paperwork for you today.    Lets try physical therapy for your back pain.    Someone should be calling you within the next 2 business days for scheduling.  Otherwise you can call [phone]

## 2024-11-27 NOTE — PROGRESS NOTES
"  Assessment & Plan     Lumbar back pain  Today's visit was a follow-up to last month's appointment where he was evaluated for lumbar back pain.  I had given him some PT exercises to do on his own 1 month ago and he says that he is feeling a bit better.  However, he has some upcoming physical testing for the [employer] and needs some paperwork completed so that he can have restrictions in place to avoid a few elements of the testing given his back pain.    Because his symptoms persist, we will have him try physical therapy for his back pain.  If PT is inadequate, we could consider imaging and referral to the spine clinic.    - Physical Therapy  Referral; Future    Patient Instructions   I completed your paperwork for you today.    Lets try physical therapy for your back pain.    Someone should be calling you within the next 2 business days for scheduling.  Otherwise you can call [phone]            BMI  Estimated body mass index is 28.17 kg/m  as calculated from the following:    Height as of this encounter: 1.727 m (5' 8\").    Weight as of this encounter: 84.1 kg (185 lb 4.8 oz).             Husam Braga is a 29 year old, presenting for the following health issues:  RECHECK (Back pain )      11/27/2024     8:20 AM   Additional Questions   Roomed by philippe         11/27/2024   Forms   Any forms needing to be completed Yes        History of Present Illness       Back Pain:  He presents for follow up of back pain. Patient's back pain is a recurring problem.  Location of back pain:  Right lower back, left lower back, right buttock, left buttock, right hip and left hip  Description of back pain: dull ache and sharp  Back pain spreads: right thigh    Since patient first noticed back pain, pain is: unchanged  Does back pain interfere with his job:  Yes       He eats 0-1 servings of fruits and vegetables daily.He consumes 0 sweetened beverage(s) daily.He exercises with enough effort to increase his heart " "rate 30 to 60 minutes per day.  He exercises with enough effort to increase his heart rate 3 or less days per week.   He is taking medications regularly.                     Objective    /80   Pulse 77   Resp 16   Ht 1.727 m (5' 8\")   Wt 84.1 kg (185 lb 4.8 oz)   SpO2 97%   BMI 28.17 kg/m    Body mass index is 28.17 kg/m .  Physical Exam               Signed Electronically by: Fidel Carpenter CNP    "

## 2025-01-02 ENCOUNTER — THERAPY VISIT (OUTPATIENT)
Dept: PHYSICAL THERAPY | Facility: REHABILITATION | Age: 30
End: 2025-01-02
Attending: NURSE PRACTITIONER
Payer: COMMERCIAL

## 2025-01-02 DIAGNOSIS — M54.50 LUMBAR BACK PAIN: ICD-10-CM

## 2025-01-02 NOTE — PROGRESS NOTES
PHYSICAL THERAPY EVALUATION  Type of Visit: Evaluation       Fall Risk Screen:  Fall screen completed by: PT  Have you fallen 2 or more times in the past year?: (Patient-Rptd) No  Have you fallen and had an injury in the past year?: (Patient-Rptd) No  Is patient a fall risk?: No    Subjective         Presenting condition or subjective complaint: (Patient-Rptd) lower back  Date of onset: 08/15/24    Relevant medical history: (Patient-Rptd) Overweight   Active Ambulatory Problems     Diagnosis Date Noted    G6PD deficiency 10/07/2019    Healthcare maintenance 11/11/2020    LTBI (latent tuberculosis infection) 11/11/2020    Atypical chest pain 11/11/2020    Family history of sudden death in father 11/11/2020    Hemorrhoids, unspecified hemorrhoid type 01/04/2021    Acute right-sided low back pain with right-sided sciatica 04/21/2022     Resolved Ambulatory Problems     Diagnosis Date Noted    No Resolved Ambulatory Problems     No Additional Past Medical History      Dates & types of surgery:    No past surgical history on file.   Prior diagnostic imaging/testing results: (Patient-Rptd) X-ray     Prior therapy history for the same diagnosis, illness or injury: (Patient-Rptd) No      Prior Level of Function  Transfers: Independent  Ambulation: Independent  ADL: Independent    Living Environment  Social support: (Patient-Rptd) With a significant other or spouse   Type of home: (Patient-Rptd) Apartment/condo; Multi-level   Stairs to enter the home: (Patient-Rptd) Yes       Ramp: (Patient-Rptd) No   Stairs inside the home: (Patient-Rptd) Yes (Patient-Rptd) 3 Is there a railing: (Patient-Rptd) Yes     Help at home: (Patient-Rptd) None  Equipment owned:       Employment: (Patient-Rptd) Yes (Patient-Rptd) IT  Hobbies/Interests: (Patient-Rptd) Golf, video game    Patient goals for therapy: (Patient-Rptd) weight lifting,bowling or golf    Pain assessment: Pain present  See objective evaluation for additional pain details      Objective   LUMBAR SPINE EVALUATION  PAIN: Pain Level at Rest: 2/10  Pain Level with Use: 6/10  Pain Location: bilateral lumbar and bilateral gluts intermittently  Pain Quality: Aching and Sharp  Pain Frequency: constant or with fluctuations up and down in pain  Pain is Exacerbated By: running, sitting, lifting  Pain is Relieved By: changing positions, lying down, stretching.    POSTURE: Standing Posture: Forward head  Sitting Posture: Forward head    GAIT:   Weightbearing Status:  full  Assistive Device(s): None  Gait Deviations: WFL      ROM:   Hip ROM( ) PROM in degrees    Left Right   Hip Flexion (0-120 )     Hip Abduction (0-45 )     Hip External Rotation (0-50 )     Hip Internal Rotation (0-40 )     Hip Extension (0-15 )     Knee flexion (120-150 )     Knee Extension (0 )      AROM in degrees    Left Right   Hip Flexion (0-120 )     Hip Abduction (0-45 )     Hip External Rotation (0-50 )     Hip Internal Rotation (0-40 )     Hip Extension (0-15 )     Knee flexion (120-150 )     Knee Extension (0 )     Lumbar ROM Left Right   Lumbar Side Bending mod Min mod   Lumbar Rotation min Mod severe   Lumbar Flexion Mod   Lumbar Extension min   Pain:   End feel:   STRENGTH (MYOTOMES):   */5 Left Right   Hip Flexion (L2) 4 4+   Hip Extension (L3)     Hip Abduction 5 5   Hip Adduction  4+ 4+   Hip Internal Rotation     Hip External Rotation     Knee Flexion 4+ 5   Knee Extension 5 5   Dorsiflexion (L4) 5 5   Great Toe Extension (L5)     Plantarflexion (S1)       DTR'S:    Left Right   C5 (Biceps)     C6 (Brachioradialis)     C7 (Triceps)     L4 (Quad) 1 1   S1 (Achilles) 3 3     CORD SIGNS:   DERMATOMES:   FLEXIBILITY: Decreased piriformis L, Decreased hip flexors L, Decreased IT band L, Decreased quadriceps L, Decreased hamstrings L, Decreased piriformis R, Decreased hip flexors R, Decreased IT band R, Decreased quadriceps R, Decreased hamstrings R  LUMBAR/HIP Special Tests:   Lumbar Special Tests Left Right   Quadrant  test     Straight leg raise Back pain    Crossover response     Slump     Prone instability test     Phu's     Sit-up test    Trunk extensor endurance test    Repeated flexion    Repeated extension    Other:    SI Tests Left Right   SI Compression     SI Distraction     POSH (Thigh Thrust)     Sacral Thrust     FADIR     DORYS Moore's Test     Resisted Abduction     Pubic shotgun     Other:       PALPATION: lumbar paraspinals not tender to palpation  SPINAL SEGMENTAL CONCLUSIONS:        Assessment & Plan   CLINICAL IMPRESSIONS  Medical Diagnosis: Lumbar back pain    Treatment Diagnosis: Lumbar back pain   Impression/Assessment: Patient is a 29 year old male with lumbar spine pain complaints.  The following significant findings have been identified: Pain, Decreased ROM/flexibility, Decreased joint mobility, Decreased strength, Impaired muscle performance, Decreased activity tolerance, and Impaired posture. These impairments interfere with their ability to perform self care tasks, work tasks, recreational activities, household chores, household mobility, and community mobility as compared to previous level of function.     Clinical Decision Making (Complexity):  Clinical Presentation: Stable/Uncomplicated  Clinical Presentation Rationale: based on medical and personal factors listed in PT evaluation  Clinical Decision Making (Complexity): Low complexity    PLAN OF CARE  Treatment Interventions:  Interventions: Manual Therapy, Neuromuscular Re-education, Therapeutic Activity, Therapeutic Exercise, Self-Care/Home Management    Long Term Goals     PT Goal 1  Goal Identifier: HEP  Goal Description: Patient will be independent with HEP and self management of symptoms to facilitate returnt to PLOF  Rationale: to maximize safety and independence with performance of ADLs and functional tasks  Goal Progress: initial  Target Date: 03/03/25  PT Goal 2  Goal Identifier: MARYLU  Goal Description: Patient will demonstrate  improved function and decreased pain by scoring 5 or less on the MARYLU.  Rationale: to maximize safety and independence within the home;to maximize safety and independence with performance of ADLs and functional tasks  Goal Progress: initial 14%  Target Date: 03/03/25  PT Goal 3  Goal Identifier: return to weight lifting  Goal Description: Patient will demonstrate improved ability to return to weight lifting with proper technique and gradual return to previous leveles      Frequency of Treatment: 1x/week  Duration of Treatment: 60 days    Recommended Referrals to Other Professionals: not at this time  Education Assessment:   Learner/Method: Patient;No Barriers to Learning    Risks and benefits of evaluation/treatment have been explained.   Patient/Family/caregiver agrees with Plan of Care.     Evaluation Time:     PT Eval, Low Complexity Minutes (31331): 25       Signing Clinician: Kennedy Martínez PT

## 2025-01-09 ENCOUNTER — THERAPY VISIT (OUTPATIENT)
Dept: PHYSICAL THERAPY | Facility: REHABILITATION | Age: 30
End: 2025-01-09
Attending: NURSE PRACTITIONER
Payer: COMMERCIAL

## 2025-01-09 DIAGNOSIS — M54.50 LUMBAR BACK PAIN: Primary | ICD-10-CM

## 2025-01-16 ENCOUNTER — THERAPY VISIT (OUTPATIENT)
Dept: PHYSICAL THERAPY | Facility: REHABILITATION | Age: 30
End: 2025-01-16
Attending: NURSE PRACTITIONER
Payer: COMMERCIAL

## 2025-01-16 DIAGNOSIS — M54.50 LUMBAR BACK PAIN: Primary | ICD-10-CM

## 2025-02-19 ENCOUNTER — THERAPY VISIT (OUTPATIENT)
Dept: PHYSICAL THERAPY | Facility: REHABILITATION | Age: 30
End: 2025-02-19
Payer: COMMERCIAL

## 2025-02-19 DIAGNOSIS — M54.50 LUMBAR BACK PAIN: Primary | ICD-10-CM

## 2025-02-19 PROCEDURE — 97140 MANUAL THERAPY 1/> REGIONS: CPT | Mod: GP | Performed by: PHYSICAL THERAPIST

## 2025-02-19 PROCEDURE — 97110 THERAPEUTIC EXERCISES: CPT | Mod: GP | Performed by: PHYSICAL THERAPIST

## 2025-03-06 ENCOUNTER — THERAPY VISIT (OUTPATIENT)
Dept: PHYSICAL THERAPY | Facility: REHABILITATION | Age: 30
End: 2025-03-06
Payer: COMMERCIAL

## 2025-03-06 DIAGNOSIS — M54.50 LUMBAR BACK PAIN: Primary | ICD-10-CM

## 2025-05-15 ENCOUNTER — OFFICE VISIT (OUTPATIENT)
Dept: FAMILY MEDICINE | Facility: CLINIC | Age: 30
End: 2025-05-15
Payer: COMMERCIAL

## 2025-05-15 VITALS
BODY MASS INDEX: 28.26 KG/M2 | HEIGHT: 68 IN | SYSTOLIC BLOOD PRESSURE: 108 MMHG | OXYGEN SATURATION: 97 % | HEART RATE: 62 BPM | DIASTOLIC BLOOD PRESSURE: 86 MMHG | TEMPERATURE: 98.2 F | RESPIRATION RATE: 18 BRPM | WEIGHT: 186.5 LBS

## 2025-05-15 DIAGNOSIS — L20.84 INTRINSIC ECZEMA: ICD-10-CM

## 2025-05-15 DIAGNOSIS — Z00.00 ROUTINE GENERAL MEDICAL EXAMINATION AT A HEALTH CARE FACILITY: Primary | ICD-10-CM

## 2025-05-15 DIAGNOSIS — Z13.0 SCREENING FOR IRON DEFICIENCY ANEMIA: ICD-10-CM

## 2025-05-15 DIAGNOSIS — Z13.6 SCREENING FOR CARDIOVASCULAR CONDITION: ICD-10-CM

## 2025-05-15 DIAGNOSIS — L65.9 ALOPECIA: ICD-10-CM

## 2025-05-15 DIAGNOSIS — Z11.59 NEED FOR HEPATITIS C SCREENING TEST: ICD-10-CM

## 2025-05-15 DIAGNOSIS — R68.82 DECREASED LIBIDO: ICD-10-CM

## 2025-05-15 DIAGNOSIS — Z13.29 SCREENING FOR THYROID DISORDER: ICD-10-CM

## 2025-05-15 DIAGNOSIS — Z13.1 SCREENING FOR DIABETES MELLITUS: ICD-10-CM

## 2025-05-15 LAB
ALBUMIN SERPL BCG-MCNC: 4.6 G/DL (ref 3.5–5.2)
ALP SERPL-CCNC: 70 U/L (ref 40–150)
ALT SERPL W P-5'-P-CCNC: 48 U/L (ref 0–70)
ANION GAP SERPL CALCULATED.3IONS-SCNC: 9 MMOL/L (ref 7–15)
AST SERPL W P-5'-P-CCNC: 46 U/L (ref 0–45)
BILIRUB SERPL-MCNC: 0.9 MG/DL
BUN SERPL-MCNC: 19.4 MG/DL (ref 6–20)
CALCIUM SERPL-MCNC: 9.5 MG/DL (ref 8.8–10.4)
CHLORIDE SERPL-SCNC: 104 MMOL/L (ref 98–107)
CHOLEST SERPL-MCNC: 217 MG/DL
CREAT SERPL-MCNC: 1.19 MG/DL (ref 0.67–1.17)
EGFRCR SERPLBLD CKD-EPI 2021: 85 ML/MIN/1.73M2
ERYTHROCYTE [DISTWIDTH] IN BLOOD BY AUTOMATED COUNT: 11.1 % (ref 10–15)
FASTING STATUS PATIENT QL REPORTED: YES
FSH SERPL IRP2-ACNC: 3.6 MIU/ML (ref 1.5–12.4)
GLUCOSE SERPL-MCNC: 94 MG/DL (ref 70–99)
GLUCOSE SERPL-MCNC: 94 MG/DL (ref 70–99)
HCG-TM SERPL-ACNC: <3 IU/L
HCO3 SERPL-SCNC: 25 MMOL/L (ref 22–29)
HCT VFR BLD AUTO: 46.4 % (ref 40–53)
HCV AB SERPL QL IA: NONREACTIVE
HDLC SERPL-MCNC: 50 MG/DL
HGB BLD-MCNC: 15.4 G/DL (ref 13.3–17.7)
LDH SERPL L TO P-CCNC: 235 U/L (ref 0–250)
LDLC SERPL CALC-MCNC: 152 MG/DL
LH SERPL-ACNC: 4.6 MIU/ML (ref 1.7–8.6)
MCH RBC QN AUTO: 30.3 PG (ref 26.5–33)
MCHC RBC AUTO-ENTMCNC: 33.2 G/DL (ref 31.5–36.5)
MCV RBC AUTO: 91 FL (ref 78–100)
NONHDLC SERPL-MCNC: 167 MG/DL
PLATELET # BLD AUTO: 318 10E3/UL (ref 150–450)
POTASSIUM SERPL-SCNC: 4.3 MMOL/L (ref 3.4–5.3)
PROLACTIN SERPL 3RD IS-MCNC: 17 NG/ML (ref 4–15)
PROT SERPL-MCNC: 7.7 G/DL (ref 6.4–8.3)
RBC # BLD AUTO: 5.08 10E6/UL (ref 4.4–5.9)
SODIUM SERPL-SCNC: 138 MMOL/L (ref 135–145)
TRIGL SERPL-MCNC: 73 MG/DL
TSH SERPL DL<=0.005 MIU/L-ACNC: 1.82 UIU/ML (ref 0.3–4.2)
WBC # BLD AUTO: 7.5 10E3/UL (ref 4–11)

## 2025-05-15 RX ORDER — CLOBETASOL PROPIONATE 0.5 MG/G
OINTMENT TOPICAL 2 TIMES DAILY
Qty: 30 G | Refills: 1 | Status: SHIPPED | OUTPATIENT
Start: 2025-05-15 | End: 2025-05-25

## 2025-05-15 SDOH — HEALTH STABILITY: PHYSICAL HEALTH: ON AVERAGE, HOW MANY DAYS PER WEEK DO YOU ENGAGE IN MODERATE TO STRENUOUS EXERCISE (LIKE A BRISK WALK)?: 7 DAYS

## 2025-05-15 SDOH — HEALTH STABILITY: PHYSICAL HEALTH: ON AVERAGE, HOW MANY MINUTES DO YOU ENGAGE IN EXERCISE AT THIS LEVEL?: 30 MIN

## 2025-05-15 ASSESSMENT — SOCIAL DETERMINANTS OF HEALTH (SDOH): HOW OFTEN DO YOU GET TOGETHER WITH FRIENDS OR RELATIVES?: ONCE A WEEK

## 2025-05-15 NOTE — PROGRESS NOTES
Preventive Care Visit  [facility]  Jorden Medina DO, Family Medicine  May 15, 2025      Routine general medical examination at a health care facility    Pleasant 30-year-old male here for physical exam as well as complaints below  Screenings as below  Works as IT for [employer]  Lives with his wife of 18 months  No children at this time  Recently had an injury in his lower back which is prevented him from doing the physical exercise that he enjoys    Should be getting 5 servings of fruits and vegetables per day, 3 servings of calcium, and 150 minutes of strenuous activity per week    Follow-up in 1 year or sooner if you have e-visits or a clinical appointment for acute complaint      Need for hepatitis C screening test    - Hepatitis C Screen Reflex to HCV RNA Quant and Genotype    Screening for thyroid disorder      Screening for iron deficiency anemia      Screening for cardiovascular condition    - Lipid panel reflex to direct LDL Fasting    Screening for diabetes mellitus    - Glucose    Alopecia  Decreased libido    Patient reports over the last 6 months he has had worsening alopecia  He started minoxidil from an online prescription company which is started allowing the front of his hair line to grow  He has had decreased desire in sexual intercourse however he is able to achieve an erection if he does has a desire  He is concerned about low testosterone  He has had changes in muscle mass however he did sustain an injury recently and his not been able to go to the gym as frequent as he used to  Because of the decreased libido with alopecia we will check him for low testosterone as well as any other hormone imbalances that may be contributing to these hormone imbalances including but not limited to testicular malignancy, prolactinoma  Patient does true testosterone deficiency we will discuss replacement therapy with gel or intramuscular injections    - Luteinizing Hormone  - Follicle  stimulating hormone  - Testosterone, total  - TSH with free T4 reflex  - Comprehensive metabolic panel (BMP + Alb, Alk Phos, ALT, AST, Total. Bili, TP)  - Prolactin  - HCG tumor marker  - Lactate Dehydrogenase  - CBC with platelets      Intrinsic eczema  Ongoing over the last couple weeks  Dry and itching  Has tried antifungal without relief  Physical exam is consistent with eczema  Will start him on clobetasol  Discussed not to use on face or genitals and not more than 10 days in a row    Patient has been advised of split billing requirements and indicates understanding: Yes    The longitudinal plan of care for the diagnosis(es)/condition(s) as documented were addressed during this visit. Due to the added complexity in care, I will continue to support Omi in the subsequent management and with ongoing continuity of care.    Subjective   Omi is a 30 year old, presenting for the following:  No chief complaint on file.           HPI    Concerns:  Rash  Last couple months was getting a rash   Itching  Not bleeding  When it started it was pink and then it gets dark  Then it gets dry  Tried a fungal cream - stopped the itching  Anterior axilla bilaterally and groing    Onset: 6 months  Description: doesn't feel as sexual. More irritable. Has been having a back issue for a little  Fatigue: when getting normal amount of sleep 7-8 hours. Unable to feel get that much  Mood: irritable - get angry at small things  Hair growth: not much. Has been taking minoxidil 2.5 mg orally - for about 5 months  Muscle mass: feels weaker because hasn't been able to work out. Had an injury to his lower back, was pushing something. LBP and down legs. Avoiding some exercises  Weight loss/gain: added weight gain from lack of exercise.  Libido: decreased desire. Able to get an erection   Changes to penis or scrotum: had a bump on his testicle - has been seen before for this     [facility]  Patient Active Problem List    Diagnosis Date Noted     Acute right-sided low back pain with right-sided sciatica 04/21/2022     Priority: Medium    Hemorrhoids, unspecified hemorrhoid type 01/04/2021     Priority: Medium    Healthcare maintenance 11/11/2020     Priority: Medium    LTBI (latent tuberculosis infection) 11/11/2020     Priority: Medium     Patient reports full treatment 2009        Atypical chest pain 11/11/2020     Priority: Medium    Family history of sudden death in father 11/11/2020     Priority: Medium    G6PD deficiency 10/07/2019     Priority: Medium         Meds  No current outpatient medications on file.     PSH  No past surgical history on file.      Lives with: wife -  18 months. No kids near future  Work: IT  Hobbies:  gym. Play video games. Trying to do something  Diet: no restrictions. Try to hit 200 mg of protein a day   Physical Activity: used to be going 5-6 days a week to the gym  Sleep: 7-8 hours    GENERAL: No fever, chills  HEENT: No headache, trauma, changes in vision, hearing, nasal congestion, dental pain, neck pain, sore throat.  Dentist:  November  Eye Doctor: none  CARDIAC: Denies chest pain or shortness of breath  LUNG: Denies dyspnea on exertion or chest pain  ABD: Denies pain, nausea, vomiting, diarrhea, constipation  : No changes in bladder habits  SKIN: see rashes  NEURO: No numbness, weakness, tingling   MSK: decreased muscle mass  PSYCH: more irritable       Advance Care Planning    Discussed advance care planning with patient; informed AVS has link to [employer].        5/15/2025   General Health   How would you rate your overall physical health? Good   Feel stress (tense, anxious, or unable to sleep) Only a little   (!) STRESS CONCERN      5/15/2025   Nutrition   Three or more servings of calcium each day? (!) NO   Diet: Regular (no restrictions)   How many servings of fruit and vegetables per day? (!) 0-1   How many sweetened beverages each day? 0-1         5/15/2025   Exercise   Days per week of  "moderate/strenous exercise 7 days   Average minutes spent exercising at this level 30 min         5/15/2025   Social Factors   Frequency of gathering with friends or relatives Once a week   Worry food won't last until get money to buy more No   Food not last or not have enough money for food? No   Do you have housing? (Housing is defined as stable permanent housing and does not include staying outside in a car, in a tent, in an abandoned building, in an overnight shelter, or couch-surfing.) Yes   Are you worried about losing your housing? No   Lack of transportation? No   Unable to get utilities (heat,electricity)? No         5/15/2025   Dental   Dentist two times every year? Yes           Today's PHQ-2 Score:       2/28/2025     9:02 AM   PHQ-2 ( 1999 [employer])   Q1: Little interest or pleasure in doing things 0   Q2: Feeling down, depressed or hopeless 0   PHQ-2 Score 0    Q1: Little interest or pleasure in doing things Not at all   Q2: Feeling down, depressed or hopeless Not at all   PHQ-2 Score 0       Patient-reported         5/15/2025   Substance Use   Alcohol more than 3/day or more than 7/wk Not Applicable   Do you use any other substances recreationally? No     Social History     Tobacco Use    Smoking status: Former     Types: Cigarettes     Passive exposure: Never    Smokeless tobacco: Never    Tobacco comments:     quit 2018   Vaping Use    Vaping status: Never Used           5/15/2025   STI Screening   New sexual partner(s) since last STI/HIV test? (!) YES          5/15/2025   Contraception/Family Planning   Questions about contraception or family planning No        Reviewed and updated as needed this visit by Provider                       Objective    Exam  There were no vitals taken for this visit.   Estimated body mass index is 28.95 kg/m  as calculated from the following:    Height as of 2/28/25: 1.727 m (5' 8\").    Weight as of 2/28/25: 86.4 kg (190 lb 6.4 oz).    Physical Exam  Vitals reviewed. "   Constitutional:       Appearance: Normal appearance.   HENT:      Head: Normocephalic and atraumatic.      Right Ear: Tympanic membrane, ear canal and external ear normal.      Left Ear: Tympanic membrane, ear canal and external ear normal.      Nose: Nose normal.      Mouth/Throat:      Mouth: Mucous membranes are moist.   Eyes:      Extraocular Movements: Extraocular movements intact.      Pupils: Pupils are equal, round, and reactive to light.   Cardiovascular:      Rate and Rhythm: Normal rate and regular rhythm.      Heart sounds: Normal heart sounds.   Pulmonary:      Effort: Pulmonary effort is normal.      Breath sounds: Normal breath sounds.   Abdominal:      General: Abdomen is flat. Bowel sounds are normal.      Palpations: Abdomen is soft.   Musculoskeletal:      Cervical back: Normal range of motion and neck supple.      Comments: Small subcentimeter multiple scaling and dry lesions throughout the torso   Skin:     General: Skin is warm and dry.   Neurological:      General: No focal deficit present.      Mental Status: He is alert.   Psychiatric:         Mood and Affect: Mood normal.         Behavior: Behavior normal.         Signed Electronically by: Jorden Medina DO

## 2025-05-15 NOTE — PATIENT INSTRUCTIONS
Thank you for seeing us at [facility].     To Review:  If you are getting lab work today, we will send you a [facility] message with recommendations once these are all returned to us.  X-rays are able to be performed in clinic and we will notify you of the results.  Any other imaging is scheduled and you will be contacted by the scheduling department.  If you do not hear from them in 2 weeks, please call [phone]   If you are getting immunizations today, you may have some arm soreness; you can use tylenol or ibuprofen for this.  If you are getting referrals you should be called within the next 3 to 5 days.    An E visit is an excellent way to get quick evaluation from myself. These can be completed using the  [employer] or online using [email or web address]. We can evaluate a variety of conditions using this including sinusitis, skin conditions, etc. Please send us a [email or web address] Message or call if having issues or questions.    Jorden Medina DO, MS  [facility]  [phone]    Patient Education   Preventive Care Advice   This is general advice given by our system to help you stay healthy. However, your care team may have specific advice just for you. Please talk to your care team about your preventive care needs.  Nutrition  Eat 5 or more servings of fruits and vegetables each day.  Try wheat bread, brown rice and whole grain pasta (instead of white bread, rice, and pasta).  Get enough calcium and vitamin D. Check the label on foods and aim for 100% of the RDA (recommended daily allowance).  Lifestyle  Exercise at least 150 minutes each week  (30 minutes a day, 5 days a week).  Do muscle strengthening activities 2 days a week. These help control your weight and prevent disease.  No smoking.  Wear sunscreen to prevent skin cancer.  Have a dental exam and cleaning every 6 months.  Yearly exams  See your health care team every year to talk about:  Any changes in your health.  Any  medicines your care team has prescribed.  Preventive care, family planning, and ways to prevent chronic diseases.  Shots (vaccines)   HPV shots (up to age 26), if you've never had them before.  Hepatitis B shots (up to age 59), if you've never had them before.  COVID-19 shot: Get this shot when it's due.  Flu shot: Get a flu shot every year.  Tetanus shot: Get a tetanus shot every 10 years.  Pneumococcal, hepatitis A, and RSV shots: Ask your care team if you need these based on your risk.  Shingles shot (for age 50 and up)  General health tests  Diabetes screening:  Starting at age 35, Get screened for diabetes at least every 3 years.  If you are younger than age 35, ask your care team if you should be screened for diabetes.  Cholesterol test: At age 39, start having a cholesterol test every 5 years, or more often if advised.  Bone density scan (DEXA): At age 50, ask your care team if you should have this scan for osteoporosis (brittle bones).  Hepatitis C: Get tested at least once in your life.  STIs (sexually transmitted infections)  Before age 24: Ask your care team if you should be screened for STIs.  After age 24: Get screened for STIs if you're at risk. You are at risk for STIs (including HIV) if:  You are sexually active with more than one person.  You don't use condoms every time.  You or a partner was diagnosed with a sexually transmitted infection.  If you are at risk for HIV, ask about PrEP medicine to prevent HIV.  Get tested for HIV at least once in your life, whether you are at risk for HIV or not.  Cancer screening tests  Cervical cancer screening: If you have a cervix, begin getting regular cervical cancer screening tests starting at age 21.  Breast cancer scan (mammogram): If you've ever had breasts, begin having regular mammograms starting at age 40. This is a scan to check for breast cancer.  Colon cancer screening: It is important to start screening for colon cancer at age 45.  Have a colonoscopy  test every 10 years (or more often if you're at risk) Or, ask your provider about stool tests like a FIT test every year or Cologuard test every 3 years.  To learn more about your testing options, visit:   .  For help making a decision, visit:   https://[email or web address]/wm83110.  Prostate cancer screening test: If you have a prostate, ask your care team if a prostate cancer screening test (PSA) at age 55 is right for you.  Lung cancer screening: If you are a current or former smoker ages 50 to 80, ask your care team if ongoing lung cancer screenings are right for you.  For informational purposes only. Not to replace the advice of your health care provider. Copyright   2023 [facility]. All rights reserved. Clinically reviewed by the [facility] Transitions Program. Appnique 101520 - REV 01/24.  Safer Sex: Care Instructions  Overview  Safer sex is a way to reduce your risk of getting a sexually transmitted infection (STI). It can also help prevent pregnancy.  Several products can help you practice safer sex and reduce your chance of STIs. One of the best is a condom. There are internal and external condoms. You can use a special rubber sheet (dental dam) for protection during oral sex. Disposable gloves can keep your hands from touching blood, semen, or other body fluids that can carry infections.  Remember that birth control methods such as diaphragms, IUDs, foams, and birth control pills do not stop you from getting STIs.  Follow-up care is a key part of your treatment and safety. Be sure to make and go to all appointments, and call your doctor if you are having problems. It's also a good idea to know your test results and keep a list of the medicines you take.  How can you care for yourself at home?  Think about getting vaccinated to help prevent hepatitis A, hepatitis B, and human papillomavirus (HPV). They can be spread through sex.  Use a condom every time you have sex. Use an external condom, which  "goes on the penis. Or use an internal condom, which goes into the vagina or anus.  Make sure you use the right size external condom. A condom that's too small can break easily. A condom that's too big can slip off during sex.  Use a new condom each time you have sex. Be careful not to poke a hole in the condom when you open the wrapper.  Don't use an internal condom and an external condom at the same time.  Never use petroleum jelly (such as Vaseline), grease, hand lotion, baby oil, or anything with oil in it. These products can make holes in the condom.  After intercourse, hold the edge of the condom as you remove it. This will help keep semen from spilling out of the condom.  Do not have sex with anyone who has symptoms of an STI, such as sores on the genitals or mouth.  Do not drink a lot of alcohol or use drugs before sex.  Limit your sex partners. Sex with one partner who has sex only with you can reduce your risk of getting an STI.  Don't share sex toys. But if you do share them, use a condom and clean the sex toys between each use.  Talk to any partners before you have sex. Talk about what you feel comfortable with and whether you have any boundaries with sex. And find out if your partner or partners may be at risk for any STI. Keep in mind that a person may be able to spread an STI even if they do not have symptoms. You and any partners may want to get tested for STIs.  Where can you learn more?  Go to https://www.[employer].net/patiented  Enter B608 in the search box to learn more about \"Safer Sex: Care Instructions.\"  Current as of: April 30, 2024  Content Version: 14.4    5640-2006 [employer].   Care instructions adapted under license by your healthcare professional. If you have questions about a medical condition or this instruction, always ask your healthcare professional. [employer] disclaims any warranty or liability for your use of this information.       "

## 2025-05-15 NOTE — PROGRESS NOTES
Preventive Care Visit  [facility]  Jorden Medina DO, Family Medicine  May 15, 2025      Assessment & Plan     Routine general medical examination at a health care facility    Pleasant 30-year-old male here for physical exam as well as complaints below  Screenings as below  Works as IT for [employer]  Lives with his wife of 18 months  No children at this time  Recently had an injury in his lower back which is prevented him from doing the physical exercise that he enjoys    Should be getting 5 servings of fruits and vegetables per day, 3 servings of calcium, and 150 minutes of strenuous activity per week    Follow-up in 1 year or sooner if you have e-visits or a clinical appointment for acute complaint      Need for hepatitis C screening test    - Hepatitis C Screen Reflex to HCV RNA Quant and Genotype    Screening for thyroid disorder      Screening for iron deficiency anemia      Screening for cardiovascular condition    - Lipid panel reflex to direct LDL Fasting    Screening for diabetes mellitus    - Glucose    Alopecia  Decreased libido    Patient reports over the last 6 months he has had worsening alopecia  He started minoxidil from an [employer] prescription company which is started allowing the front of his hair line to grow  He has had decreased desire in sexual intercourse however he is able to achieve an erection if he does has a desire  He is concerned about low testosterone  He has had changes in muscle mass however he did sustain an injury recently and his not been able to go to the gym as frequent as he used to  Because of the decreased libido with alopecia we will check him for low testosterone as well as any other hormone imbalances that may be contributing to these hormone imbalances including but not limited to testicular malignancy, prolactinoma  Patient does true testosterone deficiency we will discuss replacement therapy with gel or intramuscular injections    - Luteinizing  "Hormone  - Follicle stimulating hormone  - Testosterone, total  - TSH with free T4 reflex  - Comprehensive metabolic panel (BMP + Alb, Alk Phos, ALT, AST, Total. Bili, TP)  - Prolactin  - HCG tumor marker  - Lactate Dehydrogenase  - CBC with platelets      Intrinsic eczema  Ongoing over the last couple weeks  Dry and itching  Has tried antifungal without relief  Physical exam is consistent with eczema  Will start him on clobetasol  Discussed not to use on face or genitals and not more than 10 days in a row    Patient has been advised of split billing requirements and indicates understanding: Yes    The longitudinal plan of care for the diagnosis(es)/condition(s) as documented were addressed during this visit. Due to the added complexity in care, I will continue to support Omi in the subsequent management and with ongoing continuity of care.    BMI  Estimated body mass index is 28.36 kg/m  as calculated from the following:    Height as of this encounter: 1.727 m (5' 7.99\").    Weight as of this encounter: 84.6 kg (186 lb 8 oz).       Counseling  Appropriate preventive services were addressed with this patient via screening, questionnaire, or discussion as appropriate for fall prevention, nutrition, physical activity, Tobacco-use cessation, social engagement, weight loss and cognition.  Checklist reviewing preventive services available has been given to the patient.  Reviewed patient's diet, addressing concerns and/or questions.           Subjective   Omi is a 30 year old, presenting for the following:  Physical        5/15/2025     7:40 AM   Additional Questions   Roomed by GRACIE Cordova          HPI           Advance Care Planning    Discussed advance care planning with patient; informed AVS has link to [facility].        5/15/2025   General Health   How would you rate your overall physical health? Good   Feel stress (tense, anxious, or unable to sleep) Only a little   (!) STRESS CONCERN      5/15/2025 "   Nutrition   Three or more servings of calcium each day? (!) NO   Diet: Regular (no restrictions)   How many servings of fruit and vegetables per day? (!) 0-1   How many sweetened beverages each day? 0-1         5/15/2025   Exercise   Days per week of moderate/strenous exercise 7 days   Average minutes spent exercising at this level 30 min         5/15/2025   Social Factors   Frequency of gathering with friends or relatives Once a week   Worry food won't last until get money to buy more No   Food not last or not have enough money for food? No   Do you have housing? (Housing is defined as stable permanent housing and does not include staying outside in a car, in a tent, in an abandoned building, in an overnight shelter, or couch-surfing.) Yes   Are you worried about losing your housing? No   Lack of transportation? No   Unable to get utilities (heat,electricity)? No         5/15/2025   Dental   Dentist two times every year? Yes           Today's PHQ-2 Score:       2/28/2025     9:02 AM   PHQ-2 ( 1999 [employer])   Q1: Little interest or pleasure in doing things 0   Q2: Feeling down, depressed or hopeless 0   PHQ-2 Score 0    Q1: Little interest or pleasure in doing things Not at all   Q2: Feeling down, depressed or hopeless Not at all   PHQ-2 Score 0       Patient-reported         5/15/2025   Substance Use   Alcohol more than 3/day or more than 7/wk Not Applicable   Do you use any other substances recreationally? No     Social History     Tobacco Use    Smoking status: Former     Types: Cigarettes     Passive exposure: Never    Smokeless tobacco: Never    Tobacco comments:     quit 2018   Vaping Use    Vaping status: Never Used           5/15/2025   STI Screening   New sexual partner(s) since last STI/HIV test? (!) YES          5/15/2025   Contraception/Family Planning   Questions about contraception or family planning No        Reviewed and updated as needed this visit by Provider                       Objective   "  Exam  /86 (BP Location: Right arm, Patient Position: Sitting, Cuff Size: Adult Regular)   Temp 98.2  F (36.8  C) (Oral)   Resp 18   Ht 1.727 m (5' 7.99\")   Wt 84.6 kg (186 lb 8 oz)   BMI 28.36 kg/m     Estimated body mass index is 28.36 kg/m  as calculated from the following:    Height as of this encounter: 1.727 m (5' 7.99\").    Weight as of this encounter: 84.6 kg (186 lb 8 oz).    Physical Exam  Vitals reviewed.   Constitutional:       Appearance: Normal appearance.   HENT:      Head: Normocephalic and atraumatic.      Right Ear: Tympanic membrane, ear canal and external ear normal.      Left Ear: Tympanic membrane, ear canal and external ear normal.      Nose: Nose normal.      Mouth/Throat:      Mouth: Mucous membranes are moist.   Eyes:      Extraocular Movements: Extraocular movements intact.      Pupils: Pupils are equal, round, and reactive to light.   Cardiovascular:      Rate and Rhythm: Normal rate and regular rhythm.      Heart sounds: Normal heart sounds.   Pulmonary:      Effort: Pulmonary effort is normal.      Breath sounds: Normal breath sounds.   Abdominal:      General: Abdomen is flat. Bowel sounds are normal.      Palpations: Abdomen is soft.   Musculoskeletal:      Cervical back: Normal range of motion and neck supple.      Comments: Appropriate strength in tested fields   Skin:     General: Skin is warm and dry.      Comments: Multiple subcentimeter nonraised scaling lesions across the chest   Neurological:      General: No focal deficit present.      Mental Status: He is alert.   Psychiatric:         Mood and Affect: Mood normal.         Behavior: Behavior normal.       Signed Electronically by: Jorden Medina DO    "

## 2025-05-20 ENCOUNTER — RESULTS FOLLOW-UP (OUTPATIENT)
Dept: FAMILY MEDICINE | Facility: CLINIC | Age: 30
End: 2025-05-20
Payer: COMMERCIAL

## 2025-05-20 DIAGNOSIS — D75.A G6PD DEFICIENCY: ICD-10-CM

## 2025-05-20 DIAGNOSIS — R68.82 DECREASED LIBIDO: ICD-10-CM

## 2025-05-20 DIAGNOSIS — E22.1 HYPERPROLACTINEMIA: Primary | ICD-10-CM

## 2025-05-20 LAB — TESTOST SERPL-MCNC: 358 NG/DL (ref 240–950)

## 2025-05-20 NOTE — RESULT ENCOUNTER NOTE
I have reviewed your diagnostic work     Your cholesterol is slightly elevated.  Your HDL which is the good cholesterol is an appropriate level but your LDL which is the bad slightly elevated.  I do not believe any start medications at this time.  Please ensure you are getting appropriate exercise and watching your diet  Creatinine is slightly elevated which can come from dehydration.  One of your liver proteins AST is 1 point above elevated.  I do not believe we need to look into these further  Your testosterone is on the low side of normal with 358.  The tumor markers were elevated  You do not have HIV or hepatitis C  The values of your blood cells are all within normal limits.  Your prolactin which is a hormone coming from your brain is slightly elevated.  With an increase in this it can cause the decrease in testosterone symptoms that you are feeling.  I would recommend we get an MRI of your brain to ensure that there is no hormone secreting tumor in your brain      Please let us know if you have any questions.    Sincerely,    Dr. Medina

## 2025-06-18 ENCOUNTER — HOSPITAL ENCOUNTER (OUTPATIENT)
Dept: MRI IMAGING | Facility: CLINIC | Age: 30
Discharge: HOME | End: 2025-06-18
Payer: COMMERCIAL

## 2025-06-18 DIAGNOSIS — R68.82 DECREASED LIBIDO: ICD-10-CM

## 2025-06-18 DIAGNOSIS — E22.1 HYPERPROLACTINEMIA: ICD-10-CM

## 2025-06-18 PROCEDURE — A9585 GADOBUTROL INJECTION: HCPCS

## 2025-06-18 PROCEDURE — 70553 MRI BRAIN STEM W/O & W/DYE: CPT

## 2025-06-18 PROCEDURE — 255N000002 HC RX 255 OP 636

## 2025-06-18 RX ORDER — GADOBUTROL 604.72 MG/ML
8 INJECTION INTRAVENOUS ONCE
Status: COMPLETED | OUTPATIENT
Start: 2025-06-18 | End: 2025-06-18

## 2025-06-18 RX ADMIN — GADOBUTROL 8 ML: 604.72 INJECTION INTRAVENOUS at 17:02

## 2025-06-20 ENCOUNTER — RESULTS FOLLOW-UP (OUTPATIENT)
Dept: FAMILY MEDICINE | Facility: CLINIC | Age: 30
End: 2025-06-20

## 2025-07-16 ENCOUNTER — E-VISIT (OUTPATIENT)
Dept: FAMILY MEDICINE | Facility: CLINIC | Age: 30
End: 2025-07-16
Payer: COMMERCIAL

## 2025-07-16 DIAGNOSIS — R61 GENERALIZED HYPERHIDROSIS: Primary | ICD-10-CM

## 2025-07-16 DIAGNOSIS — R61 EXCESSIVE SWEATING: Primary | ICD-10-CM

## 2025-07-16 NOTE — PATIENT INSTRUCTIONS
Omi,    Your problem is a little bit of a difficult one for a doctor.  There is quite a range of what is normal sweating-people vary a lot.    When there is abnormal whole body sweating, please want to make sure there is not an infection or a heart problem or something that is triggering that.  Chances are that if you have had this for 9 years, this is not something medically wrong.    And unfortunately, there are not great /easy treatments for excessive sweating.  We have some medications but they make you tired and can cause other side effects.  Also, Botox injections can help, but only in the place where the injections are placed.    So, recommendations are  -Probably live with your problem unless it is really severe, getting worse, you are otherwise feeling poorly.  I would be happy to see you, just to check your blood pressure, make sure nothing else is wrong.  I I would discourage it, but if you would want to try a medication, we could try something like oxybutynin.  That I would want to see you in person first.    TK

## 2025-07-17 NOTE — PATIENT INSTRUCTIONS
Omi,    I reviewed the e-visit that was sent to Dr. Abraham as well.  He recommended you be seen by him in person.  I also do not see that this has ever been discussed in person so I would also recommend you see them in their clinic before getting referral to dermatology.  You should stick with the provider that is already your assigned primary care physician.    You can schedule an appointment by clicking here in [email or web address], or call [phone].    KIRK

## 2025-08-20 ENCOUNTER — OFFICE VISIT (OUTPATIENT)
Dept: FAMILY MEDICINE | Facility: CLINIC | Age: 30
End: 2025-08-20
Payer: COMMERCIAL

## 2025-08-20 VITALS
BODY MASS INDEX: 29.18 KG/M2 | HEART RATE: 73 BPM | DIASTOLIC BLOOD PRESSURE: 72 MMHG | TEMPERATURE: 97.7 F | WEIGHT: 192.5 LBS | OXYGEN SATURATION: 99 % | RESPIRATION RATE: 18 BRPM | HEIGHT: 68 IN | SYSTOLIC BLOOD PRESSURE: 103 MMHG

## 2025-08-20 DIAGNOSIS — R61 CRANIOFACIAL HYPERHIDROSIS: Primary | ICD-10-CM

## 2025-08-20 DIAGNOSIS — Z00.00 HEALTHCARE MAINTENANCE: ICD-10-CM

## 2025-08-20 PROCEDURE — 3074F SYST BP LT 130 MM HG: CPT | Performed by: FAMILY MEDICINE

## 2025-08-20 PROCEDURE — 3078F DIAST BP <80 MM HG: CPT | Performed by: FAMILY MEDICINE

## 2025-08-20 PROCEDURE — 99213 OFFICE O/P EST LOW 20 MIN: CPT | Performed by: FAMILY MEDICINE

## 2025-08-21 ENCOUNTER — PATIENT OUTREACH (OUTPATIENT)
Dept: CARE COORDINATION | Facility: CLINIC | Age: 30
End: 2025-08-21
Payer: COMMERCIAL